# Patient Record
Sex: FEMALE | Race: WHITE | NOT HISPANIC OR LATINO | Employment: PART TIME | URBAN - METROPOLITAN AREA
[De-identification: names, ages, dates, MRNs, and addresses within clinical notes are randomized per-mention and may not be internally consistent; named-entity substitution may affect disease eponyms.]

---

## 2017-08-07 ENCOUNTER — HOSPITAL ENCOUNTER (EMERGENCY)
Facility: HOSPITAL | Age: 32
Discharge: HOME | End: 2017-08-07
Admitting: EMERGENCY MEDICINE
Payer: MEDICARE

## 2017-08-07 VITALS
WEIGHT: 260 LBS | TEMPERATURE: 98.3 F | DIASTOLIC BLOOD PRESSURE: 98 MMHG | RESPIRATION RATE: 18 BRPM | HEART RATE: 93 BPM | SYSTOLIC BLOOD PRESSURE: 167 MMHG | OXYGEN SATURATION: 98 %

## 2017-08-07 DIAGNOSIS — M54.50 ACUTE EXACERBATION OF CHRONIC LOW BACK PAIN: Primary | ICD-10-CM

## 2017-08-07 DIAGNOSIS — G89.29 ACUTE EXACERBATION OF CHRONIC LOW BACK PAIN: Primary | ICD-10-CM

## 2017-08-07 LAB
BACTERIA UR QL AUTO: ABNORMAL /HPF
BILIRUB UR QL STRIP: NEGATIVE
CLARITY UR: CLEAR
COLOR UR: YELLOW
GLUCOSE UR STRIP-MCNC: NEGATIVE MG/DL
HCG UR QL: NEGATIVE
HGB UR QL STRIP.AUTO: NEGATIVE
KETONES UR STRIP-MCNC: ABNORMAL MG/DL
LEUKOCYTE ESTERASE UR QL STRIP: NEGATIVE
NITRITE UR QL STRIP: NEGATIVE
NON-SQ EPI CELLS URNS QL MICRO: ABNORMAL /HPF
PH UR STRIP.AUTO: 7 [PH] (ref 4.5–8)
PROT UR STRIP-MCNC: ABNORMAL MG/DL
RBC #/AREA URNS AUTO: ABNORMAL /HPF
SP GR UR STRIP.AUTO: 1.02 (ref 1–1.03)
UROBILINOGEN UR QL STRIP.AUTO: 1 E.U./DL
WBC #/AREA URNS AUTO: ABNORMAL /HPF

## 2017-08-07 PROCEDURE — 81025 URINE PREGNANCY TEST: CPT | Performed by: PHYSICIAN ASSISTANT

## 2017-08-07 PROCEDURE — 99283 EMERGENCY DEPT VISIT LOW MDM: CPT

## 2017-08-07 PROCEDURE — 81002 URINALYSIS NONAUTO W/O SCOPE: CPT | Performed by: PHYSICIAN ASSISTANT

## 2017-08-07 PROCEDURE — 81001 URINALYSIS AUTO W/SCOPE: CPT

## 2017-08-07 PROCEDURE — 96372 THER/PROPH/DIAG INJ SC/IM: CPT

## 2017-08-07 RX ORDER — CYCLOBENZAPRINE HCL 5 MG
10 TABLET ORAL 3 TIMES DAILY PRN
Qty: 15 TABLET | Refills: 0 | Status: SHIPPED | OUTPATIENT
Start: 2017-08-07 | End: 2017-09-18 | Stop reason: ALTCHOICE

## 2017-08-07 RX ORDER — KETOROLAC TROMETHAMINE 30 MG/ML
15 INJECTION, SOLUTION INTRAMUSCULAR; INTRAVENOUS ONCE
Status: COMPLETED | OUTPATIENT
Start: 2017-08-07 | End: 2017-08-07

## 2017-08-07 RX ADMIN — KETOROLAC TROMETHAMINE 15 MG: 30 INJECTION, SOLUTION INTRAMUSCULAR at 19:32

## 2017-08-13 ENCOUNTER — HOSPITAL ENCOUNTER (EMERGENCY)
Facility: HOSPITAL | Age: 32
Discharge: HOME | End: 2017-08-13
Payer: MEDICARE

## 2017-08-13 VITALS
DIASTOLIC BLOOD PRESSURE: 87 MMHG | HEART RATE: 87 BPM | RESPIRATION RATE: 16 BRPM | SYSTOLIC BLOOD PRESSURE: 157 MMHG | TEMPERATURE: 98.1 F | OXYGEN SATURATION: 97 %

## 2017-08-13 DIAGNOSIS — M54.50 ACUTE BILATERAL LOW BACK PAIN WITHOUT SCIATICA: Primary | ICD-10-CM

## 2017-08-13 PROCEDURE — 99283 EMERGENCY DEPT VISIT LOW MDM: CPT

## 2017-08-13 RX ORDER — IBUPROFEN 800 MG/1
800 TABLET ORAL EVERY 8 HOURS PRN
Qty: 30 TABLET | Refills: 0 | Status: SHIPPED | OUTPATIENT
Start: 2017-08-13 | End: 2017-09-18 | Stop reason: ALTCHOICE

## 2017-09-18 ENCOUNTER — APPOINTMENT (EMERGENCY)
Dept: RADIOLOGY | Facility: HOSPITAL | Age: 32
End: 2017-09-18
Payer: MEDICARE

## 2017-09-18 ENCOUNTER — HOSPITAL ENCOUNTER (EMERGENCY)
Facility: HOSPITAL | Age: 32
Discharge: HOME | End: 2017-09-18
Attending: EMERGENCY MEDICINE | Admitting: EMERGENCY MEDICINE
Payer: MEDICARE

## 2017-09-18 VITALS
OXYGEN SATURATION: 96 % | RESPIRATION RATE: 20 BRPM | WEIGHT: 267.8 LBS | DIASTOLIC BLOOD PRESSURE: 71 MMHG | TEMPERATURE: 98 F | HEART RATE: 89 BPM | SYSTOLIC BLOOD PRESSURE: 128 MMHG

## 2017-09-18 DIAGNOSIS — J20.9 ACUTE BRONCHITIS: Primary | ICD-10-CM

## 2017-09-18 PROCEDURE — 93005 ELECTROCARDIOGRAM TRACING: CPT | Performed by: EMERGENCY MEDICINE

## 2017-09-18 PROCEDURE — 99285 EMERGENCY DEPT VISIT HI MDM: CPT

## 2017-09-18 PROCEDURE — 96372 THER/PROPH/DIAG INJ SC/IM: CPT

## 2017-09-18 PROCEDURE — 94640 AIRWAY INHALATION TREATMENT: CPT

## 2017-09-18 PROCEDURE — 71020 HB CHEST X-RAY 2VW FRONTAL&LATL: CPT

## 2017-09-18 RX ORDER — PREDNISONE 20 MG/1
40 TABLET ORAL DAILY
Qty: 8 TABLET | Refills: 0 | Status: SHIPPED | OUTPATIENT
Start: 2017-09-18 | End: 2017-09-22

## 2017-09-18 RX ORDER — ALBUTEROL SULFATE 90 UG/1
2 AEROSOL, METERED RESPIRATORY (INHALATION) EVERY 4 HOURS PRN
Qty: 1 INHALER | Refills: 0 | Status: SHIPPED | OUTPATIENT
Start: 2017-09-18

## 2017-09-18 RX ORDER — AZITHROMYCIN 250 MG/1
TABLET, FILM COATED ORAL
Qty: 6 TABLET | Refills: 0 | Status: SHIPPED | OUTPATIENT
Start: 2017-09-18

## 2017-09-18 RX ORDER — PREDNISONE 20 MG/1
60 TABLET ORAL ONCE
Status: COMPLETED | OUTPATIENT
Start: 2017-09-18 | End: 2017-09-18

## 2017-09-18 RX ORDER — KETOROLAC TROMETHAMINE 30 MG/ML
60 INJECTION, SOLUTION INTRAMUSCULAR; INTRAVENOUS ONCE
Status: COMPLETED | OUTPATIENT
Start: 2017-09-18 | End: 2017-09-18

## 2017-09-18 RX ORDER — IPRATROPIUM BROMIDE AND ALBUTEROL SULFATE 2.5; .5 MG/3ML; MG/3ML
3 SOLUTION RESPIRATORY (INHALATION) ONCE
Status: COMPLETED | OUTPATIENT
Start: 2017-09-18 | End: 2017-09-18

## 2017-09-18 RX ADMIN — PREDNISONE 60 MG: 20 TABLET ORAL at 12:53

## 2017-09-18 RX ADMIN — IPRATROPIUM BROMIDE AND ALBUTEROL SULFATE 3 ML: .5; 3 SOLUTION RESPIRATORY (INHALATION) at 14:32

## 2017-09-18 RX ADMIN — IPRATROPIUM BROMIDE AND ALBUTEROL SULFATE 3 ML: .5; 3 SOLUTION RESPIRATORY (INHALATION) at 12:57

## 2017-09-18 RX ADMIN — KETOROLAC TROMETHAMINE 60 MG: 30 INJECTION, SOLUTION INTRAMUSCULAR at 14:33

## 2017-09-19 LAB
ATRIAL RATE: 81 BPM
P AXIS: 62 DEGREES
PR INTERVAL: 150 MS
QRS AXIS: 8 DEGREES
QRSD INTERVAL: 82 MS
QT INTERVAL: 382 MS
QTC INTERVAL: 443 MS
T WAVE AXIS: 19 DEGREES
VENTRICULAR RATE: 81 BPM

## 2018-01-05 ENCOUNTER — APPOINTMENT (EMERGENCY)
Dept: RADIOLOGY | Facility: HOSPITAL | Age: 33
End: 2018-01-05
Payer: COMMERCIAL

## 2018-01-05 ENCOUNTER — HOSPITAL ENCOUNTER (EMERGENCY)
Facility: HOSPITAL | Age: 33
Discharge: HOME | End: 2018-01-05
Admitting: EMERGENCY MEDICINE
Payer: COMMERCIAL

## 2018-01-05 VITALS
SYSTOLIC BLOOD PRESSURE: 173 MMHG | TEMPERATURE: 97.6 F | HEART RATE: 85 BPM | RESPIRATION RATE: 16 BRPM | OXYGEN SATURATION: 98 % | DIASTOLIC BLOOD PRESSURE: 104 MMHG

## 2018-01-05 DIAGNOSIS — M84.474A METATARSAL FRACTURE, PATHOLOGIC, RIGHT, INITIAL ENCOUNTER: Primary | ICD-10-CM

## 2018-01-05 PROCEDURE — 73630 X-RAY EXAM OF FOOT: CPT

## 2018-01-05 PROCEDURE — 99284 EMERGENCY DEPT VISIT MOD MDM: CPT

## 2018-01-05 RX ORDER — NAPROXEN 500 MG/1
500 TABLET ORAL 2 TIMES DAILY WITH MEALS
Qty: 14 TABLET | Refills: 0 | Status: SHIPPED | OUTPATIENT
Start: 2018-01-05 | End: 2018-01-12

## 2018-01-06 NOTE — ED NOTES
While sitting at nurses station, heard yelling coming from pts room, pt screaming "what the fuck!" Upon arrival, pt stomping feet into ground, irritated stating "what the fuck is taking so long" Explained to pt that she is next to be seen and kindly asked patient to not yell due to other patients being in the department  Pt responded "well then you should close the door" Pt continued to mumble "[address]" under her breath       Hedy Brunner, RN  01/05/18 0963
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
## 2018-02-08 ENCOUNTER — HOSPITAL ENCOUNTER (OUTPATIENT)
Dept: RADIOLOGY | Facility: HOSPITAL | Age: 33
Discharge: HOME | End: 2018-02-08
Attending: PODIATRIST
Payer: COMMERCIAL

## 2018-02-08 ENCOUNTER — TRANSCRIBE ORDERS (OUTPATIENT)
Dept: ADMINISTRATIVE | Facility: HOSPITAL | Age: 33
End: 2018-02-08

## 2018-02-08 DIAGNOSIS — S92.324A CLOSED NONDISPLACED FRACTURE OF SECOND METATARSAL BONE OF RIGHT FOOT, INITIAL ENCOUNTER: ICD-10-CM

## 2018-02-08 DIAGNOSIS — S92.324A CLOSED NONDISPLACED FRACTURE OF SECOND METATARSAL BONE OF RIGHT FOOT, INITIAL ENCOUNTER: Primary | ICD-10-CM

## 2018-02-08 PROCEDURE — 73630 X-RAY EXAM OF FOOT: CPT

## 2018-03-13 ENCOUNTER — HOSPITAL ENCOUNTER (OUTPATIENT)
Dept: RADIOLOGY | Facility: HOSPITAL | Age: 33
Discharge: HOME | End: 2018-03-13
Attending: PODIATRIST
Payer: COMMERCIAL

## 2018-03-13 ENCOUNTER — TRANSCRIBE ORDERS (OUTPATIENT)
Dept: ADMINISTRATIVE | Facility: HOSPITAL | Age: 33
End: 2018-03-13

## 2018-03-13 DIAGNOSIS — S92.334A CLOSED NONDISPLACED FRACTURE OF THIRD METATARSAL BONE OF RIGHT FOOT, INITIAL ENCOUNTER: ICD-10-CM

## 2018-03-13 DIAGNOSIS — S92.324A CLOSED NONDISPLACED FRACTURE OF SECOND METATARSAL BONE OF RIGHT FOOT, INITIAL ENCOUNTER: ICD-10-CM

## 2018-03-13 DIAGNOSIS — S92.324A CLOSED NONDISPLACED FRACTURE OF SECOND METATARSAL BONE OF RIGHT FOOT, INITIAL ENCOUNTER: Primary | ICD-10-CM

## 2018-03-13 PROCEDURE — 73630 X-RAY EXAM OF FOOT: CPT

## 2019-05-26 ENCOUNTER — HOSPITAL ENCOUNTER (EMERGENCY)
Facility: HOSPITAL | Age: 34
Discharge: HOME | End: 2019-05-26
Attending: EMERGENCY MEDICINE
Payer: MEDICARE

## 2019-05-26 VITALS
WEIGHT: 268.96 LBS | TEMPERATURE: 98 F | RESPIRATION RATE: 18 BRPM | BODY MASS INDEX: 46.17 KG/M2 | SYSTOLIC BLOOD PRESSURE: 140 MMHG | HEART RATE: 72 BPM | DIASTOLIC BLOOD PRESSURE: 95 MMHG | OXYGEN SATURATION: 99 %

## 2019-05-26 DIAGNOSIS — L30.4 INTERTRIGO: Primary | ICD-10-CM

## 2019-05-26 PROCEDURE — 99282 EMERGENCY DEPT VISIT SF MDM: CPT

## 2019-05-26 PROCEDURE — 99282 EMERGENCY DEPT VISIT SF MDM: CPT | Performed by: PHYSICIAN ASSISTANT

## 2019-05-26 RX ORDER — CLOTRIMAZOLE 1 %
1 CREAM (GRAM) TOPICAL 2 TIMES DAILY
Qty: 80 G | Refills: 0 | Status: SHIPPED | OUTPATIENT
Start: 2019-05-26 | End: 2019-07-05

## 2019-05-26 RX ORDER — CLOTRIMAZOLE 1 %
CREAM (GRAM) TOPICAL ONCE
Status: DISCONTINUED | OUTPATIENT
Start: 2019-05-26 | End: 2019-05-27 | Stop reason: HOSPADM

## 2024-01-16 ENCOUNTER — HOSPITAL ENCOUNTER (INPATIENT)
Facility: HOSPITAL | Age: 39
LOS: 4 days | Discharge: HOME | DRG: 773 | End: 2024-01-20
Attending: EMERGENCY MEDICINE | Admitting: EMERGENCY MEDICINE
Payer: MEDICARE

## 2024-01-16 DIAGNOSIS — F11.93 OPIOID WITHDRAWAL (HCC): ICD-10-CM

## 2024-01-16 DIAGNOSIS — F11.90 OPIOID USE DISORDER: ICD-10-CM

## 2024-01-16 DIAGNOSIS — F11.10 HEROIN ABUSE (HCC): Primary | ICD-10-CM

## 2024-01-16 DIAGNOSIS — L03.90 CELLULITIS: ICD-10-CM

## 2024-01-16 DIAGNOSIS — F19.10 INTRAVENOUS DRUG ABUSE (HCC): ICD-10-CM

## 2024-01-16 DIAGNOSIS — F14.10 COCAINE ABUSE (HCC): ICD-10-CM

## 2024-01-16 DIAGNOSIS — R76.8 HEPATITIS C ANTIBODY POSITIVE IN BLOOD: ICD-10-CM

## 2024-01-16 DIAGNOSIS — I10 HYPERTENSION, UNSPECIFIED TYPE: ICD-10-CM

## 2024-01-16 PROBLEM — F17.200 TOBACCO USE DISORDER: Status: ACTIVE | Noted: 2024-01-16

## 2024-01-16 LAB
ALBUMIN SERPL BCP-MCNC: 4 G/DL (ref 3.5–5)
ALP SERPL-CCNC: 65 U/L (ref 34–104)
ALT SERPL W P-5'-P-CCNC: 8 U/L (ref 7–52)
ANION GAP SERPL CALCULATED.3IONS-SCNC: 8 MMOL/L
AST SERPL W P-5'-P-CCNC: 14 U/L (ref 13–39)
ATRIAL RATE: 79 BPM
BASOPHILS # BLD AUTO: 0.03 THOUSANDS/ÂΜL (ref 0–0.1)
BASOPHILS NFR BLD AUTO: 1 % (ref 0–1)
BILIRUB SERPL-MCNC: 0.24 MG/DL (ref 0.2–1)
BUN SERPL-MCNC: 9 MG/DL (ref 5–25)
CALCIUM SERPL-MCNC: 9.5 MG/DL (ref 8.4–10.2)
CHLORIDE SERPL-SCNC: 103 MMOL/L (ref 96–108)
CO2 SERPL-SCNC: 27 MMOL/L (ref 21–32)
CREAT SERPL-MCNC: 0.86 MG/DL (ref 0.6–1.3)
EOSINOPHIL # BLD AUTO: 0.17 THOUSAND/ÂΜL (ref 0–0.61)
EOSINOPHIL NFR BLD AUTO: 3 % (ref 0–6)
ERYTHROCYTE [DISTWIDTH] IN BLOOD BY AUTOMATED COUNT: 15.5 % (ref 11.6–15.1)
ETHANOL SERPL-MCNC: <10 MG/DL
GFR SERPL CREATININE-BSD FRML MDRD: 87 ML/MIN/1.73SQ M
GLUCOSE SERPL-MCNC: 89 MG/DL (ref 65–140)
HCT VFR BLD AUTO: 41.6 % (ref 34.8–46.1)
HGB BLD-MCNC: 13.6 G/DL (ref 11.5–15.4)
IMM GRANULOCYTES # BLD AUTO: 0.01 THOUSAND/UL (ref 0–0.2)
IMM GRANULOCYTES NFR BLD AUTO: 0 % (ref 0–2)
LYMPHOCYTES # BLD AUTO: 2.12 THOUSANDS/ÂΜL (ref 0.6–4.47)
LYMPHOCYTES NFR BLD AUTO: 33 % (ref 14–44)
MCH RBC QN AUTO: 27.2 PG (ref 26.8–34.3)
MCHC RBC AUTO-ENTMCNC: 32.7 G/DL (ref 31.4–37.4)
MCV RBC AUTO: 83 FL (ref 82–98)
MONOCYTES # BLD AUTO: 0.42 THOUSAND/ÂΜL (ref 0.17–1.22)
MONOCYTES NFR BLD AUTO: 7 % (ref 4–12)
NEUTROPHILS # BLD AUTO: 3.6 THOUSANDS/ÂΜL (ref 1.85–7.62)
NEUTS SEG NFR BLD AUTO: 56 % (ref 43–75)
NRBC BLD AUTO-RTO: 0 /100 WBCS
P AXIS: 73 DEGREES
PLATELET # BLD AUTO: 441 THOUSANDS/UL (ref 149–390)
PMV BLD AUTO: 8.5 FL (ref 8.9–12.7)
POTASSIUM SERPL-SCNC: 3.8 MMOL/L (ref 3.5–5.3)
PR INTERVAL: 152 MS
PROT SERPL-MCNC: 7.9 G/DL (ref 6.4–8.4)
QRS AXIS: -5 DEGREES
QRSD INTERVAL: 86 MS
QT INTERVAL: 368 MS
QTC INTERVAL: 421 MS
RBC # BLD AUTO: 5 MILLION/UL (ref 3.81–5.12)
SODIUM SERPL-SCNC: 138 MMOL/L (ref 135–147)
T WAVE AXIS: 57 DEGREES
VENTRICULAR RATE: 79 BPM
WBC # BLD AUTO: 6.35 THOUSAND/UL (ref 4.31–10.16)

## 2024-01-16 PROCEDURE — 85025 COMPLETE CBC W/AUTO DIFF WBC: CPT | Performed by: EMERGENCY MEDICINE

## 2024-01-16 PROCEDURE — 99291 CRITICAL CARE FIRST HOUR: CPT | Performed by: PHYSICIAN ASSISTANT

## 2024-01-16 PROCEDURE — 93005 ELECTROCARDIOGRAM TRACING: CPT

## 2024-01-16 PROCEDURE — 99285 EMERGENCY DEPT VISIT HI MDM: CPT | Performed by: EMERGENCY MEDICINE

## 2024-01-16 PROCEDURE — 80053 COMPREHEN METABOLIC PANEL: CPT | Performed by: EMERGENCY MEDICINE

## 2024-01-16 PROCEDURE — 99284 EMERGENCY DEPT VISIT MOD MDM: CPT

## 2024-01-16 PROCEDURE — 90715 TDAP VACCINE 7 YRS/> IM: CPT | Performed by: PHYSICIAN ASSISTANT

## 2024-01-16 PROCEDURE — 36415 COLL VENOUS BLD VENIPUNCTURE: CPT | Performed by: EMERGENCY MEDICINE

## 2024-01-16 PROCEDURE — 82077 ASSAY SPEC XCP UR&BREATH IA: CPT | Performed by: EMERGENCY MEDICINE

## 2024-01-16 RX ORDER — TRAZODONE HYDROCHLORIDE 50 MG/1
50 TABLET ORAL
Status: DISCONTINUED | OUTPATIENT
Start: 2024-01-16 | End: 2024-01-20 | Stop reason: HOSPADM

## 2024-01-16 RX ORDER — ENOXAPARIN SODIUM 100 MG/ML
40 INJECTION SUBCUTANEOUS DAILY
Status: DISCONTINUED | OUTPATIENT
Start: 2024-01-16 | End: 2024-01-20 | Stop reason: HOSPADM

## 2024-01-16 RX ORDER — SODIUM CHLORIDE 9 MG/ML
100 INJECTION, SOLUTION INTRAVENOUS CONTINUOUS
Status: DISCONTINUED | OUTPATIENT
Start: 2024-01-16 | End: 2024-01-17

## 2024-01-16 RX ORDER — HYDROXYZINE 50 MG/1
50 TABLET, FILM COATED ORAL EVERY 6 HOURS PRN
Status: DISCONTINUED | OUTPATIENT
Start: 2024-01-16 | End: 2024-01-16

## 2024-01-16 RX ORDER — ONDANSETRON 2 MG/ML
4 INJECTION INTRAMUSCULAR; INTRAVENOUS EVERY 6 HOURS PRN
Status: DISCONTINUED | OUTPATIENT
Start: 2024-01-16 | End: 2024-01-20 | Stop reason: HOSPADM

## 2024-01-16 RX ORDER — SENNOSIDES 8.6 MG
1 TABLET ORAL DAILY
Status: DISCONTINUED | OUTPATIENT
Start: 2024-01-16 | End: 2024-01-20 | Stop reason: HOSPADM

## 2024-01-16 RX ORDER — IBUPROFEN 400 MG/1
400 TABLET ORAL EVERY 4 HOURS PRN
Status: DISCONTINUED | OUTPATIENT
Start: 2024-01-16 | End: 2024-01-20 | Stop reason: HOSPADM

## 2024-01-16 RX ORDER — GABAPENTIN 300 MG/1
300 CAPSULE ORAL EVERY 8 HOURS PRN
Status: DISCONTINUED | OUTPATIENT
Start: 2024-01-16 | End: 2024-01-20 | Stop reason: HOSPADM

## 2024-01-16 RX ORDER — SODIUM CHLORIDE 9 MG/ML
3 INJECTION INTRAVENOUS
Status: DISCONTINUED | OUTPATIENT
Start: 2024-01-16 | End: 2024-01-16

## 2024-01-16 RX ORDER — NICOTINE 21 MG/24HR
1 PATCH, TRANSDERMAL 24 HOURS TRANSDERMAL DAILY
Status: DISCONTINUED | OUTPATIENT
Start: 2024-01-16 | End: 2024-01-20 | Stop reason: HOSPADM

## 2024-01-16 RX ORDER — LANOLIN ALCOHOL/MO/W.PET/CERES
6 CREAM (GRAM) TOPICAL
Status: DISCONTINUED | OUTPATIENT
Start: 2024-01-16 | End: 2024-01-20 | Stop reason: HOSPADM

## 2024-01-16 RX ORDER — CLONIDINE HYDROCHLORIDE 0.1 MG/1
0.1 TABLET ORAL EVERY 6 HOURS PRN
Status: DISCONTINUED | OUTPATIENT
Start: 2024-01-16 | End: 2024-01-20 | Stop reason: HOSPADM

## 2024-01-16 RX ORDER — CLONAZEPAM 0.5 MG/1
1 TABLET ORAL 4 TIMES DAILY PRN
Status: DISCONTINUED | OUTPATIENT
Start: 2024-01-16 | End: 2024-01-19

## 2024-01-16 RX ORDER — DOCUSATE SODIUM 100 MG/1
100 CAPSULE, LIQUID FILLED ORAL 2 TIMES DAILY
Status: DISCONTINUED | OUTPATIENT
Start: 2024-01-16 | End: 2024-01-16

## 2024-01-16 RX ORDER — BUPRENORPHINE 20 UG/H
20 PATCH TRANSDERMAL
Status: COMPLETED | OUTPATIENT
Start: 2024-01-16 | End: 2024-01-19

## 2024-01-16 RX ORDER — DIAZEPAM 5 MG/1
5 TABLET ORAL ONCE
Status: COMPLETED | OUTPATIENT
Start: 2024-01-16 | End: 2024-01-16

## 2024-01-16 RX ADMIN — SODIUM CHLORIDE 100 ML/HR: 0.9 INJECTION, SOLUTION INTRAVENOUS at 23:50

## 2024-01-16 RX ADMIN — BUPRENORPHINE 20 MCG: 20 PATCH, EXTENDED RELEASE TRANSDERMAL at 15:31

## 2024-01-16 RX ADMIN — IBUPROFEN 400 MG: 400 TABLET ORAL at 15:30

## 2024-01-16 RX ADMIN — SODIUM CHLORIDE 100 ML/HR: 0.9 INJECTION, SOLUTION INTRAVENOUS at 15:30

## 2024-01-16 RX ADMIN — CLONIDINE HYDROCHLORIDE 0.1 MG: 0.1 TABLET ORAL at 22:25

## 2024-01-16 RX ADMIN — TRAZODONE HYDROCHLORIDE 50 MG: 50 TABLET ORAL at 21:13

## 2024-01-16 RX ADMIN — DIAZEPAM 5 MG: 5 TABLET ORAL at 13:22

## 2024-01-16 RX ADMIN — GABAPENTIN 300 MG: 300 CAPSULE ORAL at 21:13

## 2024-01-16 RX ADMIN — ENOXAPARIN SODIUM 40 MG: 40 INJECTION SUBCUTANEOUS at 15:45

## 2024-01-16 RX ADMIN — TETANUS TOXOID, REDUCED DIPHTHERIA TOXOID AND ACELLULAR PERTUSSIS VACCINE, ADSORBED 0.5 ML: 5; 2.5; 8; 8; 2.5 SUSPENSION INTRAMUSCULAR at 17:35

## 2024-01-16 RX ADMIN — CLONAZEPAM 1 MG: 0.5 TABLET ORAL at 17:35

## 2024-01-16 RX ADMIN — NICOTINE POLACRILEX 2 MG: 2 GUM, CHEWING ORAL at 21:13

## 2024-01-16 RX ADMIN — MELATONIN TAB 3 MG 6 MG: 3 TAB at 22:25

## 2024-01-16 RX ADMIN — IBUPROFEN 400 MG: 400 TABLET ORAL at 21:13

## 2024-01-16 RX ADMIN — CLONIDINE HYDROCHLORIDE 0.1 MG: 0.1 TABLET ORAL at 15:30

## 2024-01-16 RX ADMIN — NICOTINE 1 PATCH: 21 PATCH, EXTENDED RELEASE TRANSDERMAL at 15:30

## 2024-01-16 NOTE — ASSESSMENT & PLAN NOTE
Patient admits to smoking approximately 12 cigarettes/day  Encourage cessation  Will order nicotine patch

## 2024-01-16 NOTE — ASSESSMENT & PLAN NOTE
"Patient admits to using IV heroin for the past 3 years straight, has not been sober in these past 3 years  She states she is using approximately 6-10 bags daily, states she tries not to use more than 1 bundle per day  Last use was around 1 AM on 1/16/24  She was on Suboxone years ago, only had it filled 1 time. Was also at methadone clinic recently and followed for 3 days only   Upon reviewing PDMP, last and only Suboxone prescription was filled on 2/1/2023 for 8 day supply   Agreeable to suboxone treatment  Initiate MAT protocol - See \"Opioid Withdrawal\"  "

## 2024-01-16 NOTE — ASSESSMENT & PLAN NOTE
"Patient has multiple injection sites on bilateral legs and arms, healing wound seen on right 5th digit (Patient says the wound has been present for about a month and appears to be slowly healing following initiation of the abx)  Denies any fevers, WBC count is normal - No evidence of acute infection at this time  Continue to monitor injection sites for signs of infection  HIV non-reactive  Hep C reactive (see \"Hepatitis C antibody positive in blood\")  Hep B nonreactive  Tetanus shot ordered  Obtain repeat CMP & CBC tomorrow am   Concern for possible soft tissue infection/osteomyelitis and bacteremia:  R hand Xray shows no evidence of osteomyelitis or significant soft tissue damage  CRP wnl  Blood cultures- no growth at 24 hours   Transitioned to PO antibiotics       "

## 2024-01-16 NOTE — ASSESSMENT & PLAN NOTE
Patient admits to smoking cocaine approximately twice weekly  She has been hypertensive throughout encounter likely secondary to anxiety  Denies any chest pain but admits to mild SOB with anxiety  EKG performed in the ED and reviewed by myself- no evidence of acute ischemia  UDS screen is pending  Encourage cessation, continue supportive care/comfort meds

## 2024-01-16 NOTE — ASSESSMENT & PLAN NOTE
"Patient admits to using IV heroin for the past 3 years straight, has not been sober in these past 3 years  She states she is using approximately 6-10 bags daily, states she tries not to use more than 1 bundle per day  Last use was around 1 AM on 1/16/24  She was on Suboxone years ago, only had it filled 1 time. Was also at methadone clinic recently and followed for 3 days only   Agreeable to suboxone treatment  HIV/hepatitis panel pending  Initiate MAT protocol - See \"Opioid Withdrawal\"  "

## 2024-01-16 NOTE — ED PROVIDER NOTES
History  No chief complaint on file.    38-year-old female presents requesting detoxification from heroin and cocaine.  She injects both.  She last used at approximately 0100 today.  She has been using 6 bags of heroin daily for the past few weeks, previously using 1-2 bundles daily for at least 3 months.  She denies other concomitant drug or alcohol use.  She has no significant ongoing medical problems.  She does have depression and anxiety which are untreated at this time, though she is supposed to be taking prescribed medications.  She smokes half a pack a day to ameliorate her psychiatric symptoms.  She injects multiple areas of her wrists, hands and legs, including skin popping.  There are several scabbed areas but none that appear actively infected.  She is beginning to feel withdrawal symptoms, including piloerection, anxiousness and nausea.  She is agreeable to buprenorphine treatment.  She denies SI, HI or command hallucinations.    Denies fever, HA, CP, SOB, abdominal pain, v/d. 12 system ROS o/w negative.            History provided by:  Patient and medical records  Detox Evaluation  Similar prior episodes: yes    Severity:  Moderate  Onset quality:  Gradual  Duration:  10 hours  Timing:  Constant  Progression:  Worsening  Chronicity:  Recurrent  Suspected agents:  Cocaine and heroin  Associated symptoms: nausea    Associated symptoms: no abdominal pain, no agitation, no bladder incontinence, no bowel incontinence, no confusion, no hallucinations, no headaches, no loss of consciousness, no palpitations, no seizures, no shortness of breath, no violence, no vomiting and no weakness    Risk factors: mental illness and withdrawal syndrome    Risk factors: no addiction treatment, no chronic illness, no psychiatric hx, no recent illness and no recent infection        Prior to Admission Medications   Prescriptions Last Dose Informant Patient Reported? Taking?   albuterol (PROVENTIL HFA,VENTOLIN HFA) 90 mcg/act  inhaler   No No   Sig: Inhale 2 puffs every 4 (four) hours as needed for wheezing   azithromycin (ZITHROMAX Z-MCKENNA) 250 mg tablet   No No   Si tabs po daily x 1 day, then 1 tab po daily x 4 days   clotrimazole (LOTRIMIN) 1 % cream   No No   Sig: Apply 1 g (1 application total) topically 2 (two) times a day for 40 days   naproxen (NAPROSYN) 500 mg tablet   No No   Sig: Take 1 tablet by mouth 2 (two) times a day with meals for 7 days      Facility-Administered Medications: None       Past Medical History:   Diagnosis Date   • Asthma    • Back pain        Past Surgical History:   Procedure Laterality Date   • FOOT SURGERY     • KNEE SURGERY         Family History   Problem Relation Age of Onset   • Diabetes type II Family      I have reviewed and agree with the history as documented.    E-Cigarette/Vaping     E-Cigarette/Vaping Substances     Social History     Tobacco Use   • Smoking status: Every Day   • Smokeless tobacco: Never   Substance Use Topics   • Alcohol use: Yes     Comment: occasional   • Drug use: No       Review of Systems   Constitutional:  Positive for chills. Negative for diaphoresis and fever.   HENT:  Negative for rhinorrhea, sore throat and trouble swallowing.    Respiratory:  Negative for cough, chest tightness, shortness of breath and wheezing.    Cardiovascular:  Negative for chest pain, palpitations and leg swelling.   Gastrointestinal:  Positive for nausea. Negative for abdominal distention, abdominal pain, bowel incontinence, constipation, diarrhea and vomiting.   Genitourinary:  Negative for bladder incontinence, difficulty urinating, dysuria, flank pain, frequency, hematuria and urgency.   Musculoskeletal:  Negative for arthralgias, back pain, myalgias, neck pain and neck stiffness.   Skin:  Negative for pallor and rash.   Neurological:  Negative for dizziness, seizures, loss of consciousness, weakness, light-headedness and headaches.   Hematological:  Negative for adenopathy.    Psychiatric/Behavioral:  Negative for agitation, confusion and hallucinations. The patient is not nervous/anxious.    All other systems reviewed and are negative.      Physical Exam  Physical Exam  Vitals reviewed.   Constitutional:       General: She is not in acute distress.     Appearance: She is well-developed. She is not ill-appearing, toxic-appearing or diaphoretic.   HENT:      Head: Normocephalic and atraumatic.      Right Ear: External ear normal.      Left Ear: External ear normal.      Nose: Nose normal.      Mouth/Throat:      Mouth: Mucous membranes are moist.      Pharynx: Oropharynx is clear. No oropharyngeal exudate.   Eyes:      General: No scleral icterus.     Conjunctiva/sclera: Conjunctivae normal.      Pupils: Pupils are equal, round, and reactive to light.   Cardiovascular:      Rate and Rhythm: Normal rate and regular rhythm.      Heart sounds: No murmur heard.  Pulmonary:      Effort: Pulmonary effort is normal.      Breath sounds: Normal breath sounds.   Abdominal:      General: Bowel sounds are normal. There is no distension.      Palpations: Abdomen is soft.      Tenderness: There is no abdominal tenderness. There is no right CVA tenderness or left CVA tenderness.   Musculoskeletal:         General: No tenderness. Normal range of motion.      Cervical back: Normal range of motion and neck supple.      Right lower leg: No edema.      Left lower leg: No edema.   Lymphadenopathy:      Cervical: No cervical adenopathy.   Skin:     General: Skin is warm and dry.      Coloration: Skin is not pale.      Findings: No erythema or rash.      Comments: Numerous acute and chronic skin pop lesions on both legs, scattered lesions on her hands, recent injection sites on both wrists.   Neurological:      General: No focal deficit present.      Mental Status: She is alert and oriented to person, place, and time.      Motor: No abnormal muscle tone.      Deep Tendon Reflexes: Reflexes are normal and  symmetric.   Psychiatric:         Mood and Affect: Mood normal.         Behavior: Behavior normal.         Thought Content: Thought content normal.         Vital Signs  ED Triage Vitals [01/16/24 1133]   Temperature Pulse Respirations Blood Pressure SpO2   98.6 °F (37 °C) 89 18 (!) 230/144 100 %      Temp Source Heart Rate Source Patient Position - Orthostatic VS BP Location FiO2 (%)   Tympanic Monitor Lying Left arm --      Pain Score       --           Vitals:    01/16/24 1133   BP: (!) 230/144   Pulse: 89   Patient Position - Orthostatic VS: Lying         Visual Acuity      ED Medications  Medications   sodium chloride (PF) 0.9 % injection 3 mL (has no administration in time range)       Diagnostic Studies  Results Reviewed       Procedure Component Value Units Date/Time    CBC [[id number]]     Lab Status: No result Specimen: Blood     CMP [[id number]]     Lab Status: No result Specimen: Blood     Serum ethanol (medical alcohol) [[id number]]     Lab Status: No result Specimen: Blood     Urine drug screen [[id number]]     Lab Status: No result Specimen: Urine                    No orders to display              Procedures  Procedures         ED Course  ED Course as of 01/16/24 1338   Tue Jan 16, 2024   1235 [facility] contacted for admission.                                             Medical Decision Making  MDM/DDx: Heroin abuse, cocaine abuse, depression, anxiety.     I independently reviewed and interpreted ordered labs from this encounter.    A/P: Will check detox admission workup, consult withdrawal management unit for admission.    Amount and/or Complexity of Data Reviewed  Labs: ordered.  ECG/medicine tests: ordered.    Risk  Prescription drug management.             Disposition  Final diagnoses:   None     ED Disposition       None          Follow-up Information    None         Patient's Medications   Discharge Prescriptions    No medications on file       No discharge procedures on file.    PDMP Review       None             ED Provider  Electronically Signed by             Fabian Mercado DO  01/16/24 6529

## 2024-01-16 NOTE — H&P
"HISTORY & PHYSICAL EXAM  DEPARTMENT OF MEDICAL TOXICOLOGY  LEVEL 4 MEDICAL DETOX UNIT  Karen Mason 38 y.o. female MRN: [id number]  Unit/Bed#:  DETOX 510-01 Encounter: [id number]      Reason for Admission/Principal Problem: Opioid withdrawal, opioid use disorder  Admitting Provider: Patti Mitchell PA-C  Attending Provider: Fam Moreno MD   1/16/2024 11:20 AM      Opioid use disorder  Assessment & Plan  Patient admits to using IV heroin for the past 3 years straight, has not been sober in these past 3 years  She states she is using approximately 6-10 bags daily, states she tries not to use more than 1 bundle per day  Last use was around 1 AM on 1/16/24  She was on Suboxone years ago, only had it filled 1 time. Was also at methadone clinic recently and followed for 3 days only   Upon reviewing PDMP, last and only Suboxone prescription was filled on 2/1/2023 for 8 day supply   Agreeable to suboxone treatment  HIV/hepatitis panel pending  Initiate MAT protocol - See \"Opioid Withdrawal\"    * Opioid withdrawal (HCC)  Assessment & Plan  See \"Opioid Use Disorder\"  Using IV heroin 6-10 bags daily, last use around 0100   Current withdrawal symptoms include chills, sweats, anxiety, hypertension  Butrans patch placed on admission  Initiate MAT protocol and monitor for appropriateness of suboxone induction at least 24 hours after last use (0100)  Continue comfort meds for symptoms including anxiety meds gabapentin, clonidine, klonopin and zofran for nausea  Case management consult for dispo plan, pt unsure about inpatient rehab at this point    Cocaine abuse (HCC)  Assessment & Plan  Patient admits to smoking cocaine approximately twice weekly  She has been hypertensive throughout encounter likely secondary to anxiety  Denies any chest pain but admits to mild SOB with anxiety  EKG performed in the ED and reviewed by myself- no evidence of acute ischemia  UDS screen is pending  Encourage cessation, continue " supportive care/comfort meds    Intravenous drug abuse (HCC)  Assessment & Plan  Patient has multiple injection sites on bilateral legs and arms, healing wound seen on right 5th digit  Denies any fevers, WBC count is normal - No evidence of acute infection at this time  Continue to monitor injection sites for signs of infection  Will screen for HIV and hepatitis  Tetanus shot ordered    Tobacco use disorder  Assessment & Plan  Patient admits to smoking approximately 12 cigarettes/day  Encourage cessation  Will order nicotine patch           Additional medical treatment(s) includes BP management- continue to monitor for now and may need to initiate HTN meds       VTE Prophylaxis: Enoxaparin (Lovenox)  / sequential compression device   Code Status: Full code      Anticipated Length of Stay:  Patient will be admitted on an Inpatient basis with an anticipated length of stay of  >2 midnights.   Justification for Hospital Stay: OPIOID USE DISORDER, OPIOID WITHDRAWAL     For any questions or concerns, please Tiger Text the advanced practitioner in the role of [facility]-DETOX-AP On Call      This patient qualifies for Level IV medically managed intensive inpatient services under the criteria set by the American Society of Addiction Medicine, including dimensions 1-3. The patient is in withdrawal (or is intoxicated with high risk of withdrawal), with severe and unstable medical and/or psychiatric (dual diagnosis) problems, requiring requires 24-hour medical and nursing care and the full resources of a licensed hospital.          MEDICATION ASSISTED TREATMENT PATHWAY    Admit patient to medical detox unit and continue supportive care and stabilization of acute opioid withdrawal per medical toxicology/detox medication assisted treatment pathway.     Complicated Opioid Withdrawal (ie associated with long acting agents, such as methadone or illicit fentanyl analogues):  >72 hours: Routine COWS/induction as per uncomplicated opoid  withdrawal (below)  <72 hours:  Adjunctive medications (see below), including clonazepam 1-2mg Q6 hrs PRN anxiety (or diazepam 5 mg if cannot take PO)  >48 hours, test dose buprenorphine 0.5-1mg.   If responds well, continue with 2mg Q2 hrs (total 8mg) holding for worsening withdrawal, then start maintenance dosing   If responds poorly, follow next step below   <48 hours and/or COWS < 6 or failed test dose, add Butrans transdermal patch 20-40mcg/hr, monitor for signs/symptoms of withdrawal   If within first 24-48 hrs, can administer buprenorphine 0.5-1mg Q6 hrs x 4, followed by 2mg Q2 hrs x 4 the next day  If surpassing 48 hrs, can administer buprenorphine 2mg Q2hrs if tolerated without transdermal patch or lower sublingual dose.   When complete, remove transdermal patch and start maintenance dosing   For moderate-severe withdrawal (COWS >/= 8, may still consider routine induction with buprenorphine 8 mg if appropriate.   For worsened or precipitated withdrawal, consider adjunctive benzodiazepines and other comfort meds. Dexmedetomidine may be considered for severe precipitated withdrawal.         Uncomplicated Opioid Withdrawal:  Monitor opioid severity via Clinical Opioid Withdrawal Scale (COWS) Q4 hours and administer buprenorphine/naloxone 8mg/2mg when COWS >8, or when greater than 24 hours have elapsed from most recent opioid use (excluding long-acting opioids, such as methadone). Continue to monitor opioid severity Q30-60 minutes after first dose and administer additional buprenorphine 2-4mg every 30-60 minutes until COWS < 8 for two consecutive hours.              Adjunctive medications administered as needed:  Clonidine 0.1 mg PO Q6 hours PRN anxiety or palpitations    Gabapentin 300mg PO Q8 hours PRN anxiety    Ibuprofen 600 mg PO Q6 hours PRN pain    Acetaminophen 1000mg PO Q8 hours PRN pain    Ondansetron 4 mg PO Q6 hours PRN N/V    Nicotine patch 7, 14, 21 mg  PRN nicotine withdrawal   Trazodone 50 mg PO  QHS PRN sleep    Loperamide 4 mg PO PRN diarrhea up to 16 mg/day       The risks, benefits and mechanism of buprenorphine/naloxone were discussed and patient agreed to treatment. Case management consultation will take place to assist with coordination of subsequent treatment after discharge.     Administer daily multivitamin. Evaluate and treat for coexisting substance use, such as nicotine. Discuss risk factors for infectious disease, such as history of intravenous drug abuse, and offer hepatitis and HIV screening if indicated.           Hx and PE limited by: None    HPI: Karen Mason is a 38 y.o. year old female who presents with opioid use disorder/withdrawal.  She presented to the hospitals ED today requesting detox from heroin and cocaine.  She states she uses 6-10 bags of IV heroin daily for the past few months, also admits to smoking cocaine twice weekly. Last use of heroin was around 0100 today. She denies alcohol use.  She states was on Suboxone in the past, also recently went to methadone clinic but only followed up for 3 days.  She is agreeable to suboxone induction.  She admits to feeling very anxious currently but denies SI or HI.  She has been hypertensive in the ED, states she has a history of hypertension but has not taken meds in a long time.  She denies headaches or chest pain but admits to mild shortness of breath at times, denies shortness of breath currently.  EKG without signs of acute ischemia.  She denies cardiac history.  Patient will be admitted to the detox unit and initiated on MAT protocol.    Opioids currently used: heroin  Route of use: intravenous  Date/Time of Last Opioid Use: 1/16/24 0100  Current Signs/Symptoms of Opioid Withdrawal: restlessness, anxiety, GI upset, and piloerection    COWS score:   Clinical Opiate Withdrawal Scale  Pulse: 78  Resting Pulse Rate: Measured After Patient is Sitting or Lying for One Minute: Pulse rate 80 or below  GI Upset: Over Last Half Hour: Stomach  cramps  Sweating: Over Past Half Hour Not Accounted for by Room Temperature of Patient Activity: Subjective report of chills or flushing  Tremor: Observation of Outstretched Hands: Tremor can be felt, but not observed  Restlessness: Observation During Assessment: Reports difficulty sitting still, but is able to do so  Yawning: Observation During Assessment: No yawning  Pupil Size: Pupils possibly larger than normal for room light  Anxiety and Irritability: Patient reports increasing irritability or anxiousness  Bone or Joint Aches: If Patient was Having Pain Previously, Only the Additional Component Attributed to Opiate Withdrawal is Scored: Mild diffuse discomfort  Gooseflesh Skin: Piloerection of skin can be felt or hairs standing up on arms  Runny Nose or Tearing: Not Accounted for by Cold Symptoms or Allergies: Nasal stuffiness of unusually moist eyes  Clinical Opiate Withdrawal Scale Total Score: 11        Methadone & Buprenorphine History  History of prior treatment for opioid dependence? yes  Currently on Methadone Maintenance? yes  History of prior treatment with Suboxone? yes  Currently taking Suboxone? no  History of using Suboxone without having a prescription? no  History of IVDA? yes  Co-existing substance use? yes    Review of PDMP: yes    Social History     Substance and Sexual Activity   Alcohol Use Yes    Comment: occasional     Social History     Substance and Sexual Activity   Drug Use Yes    Types: Cocaine, Heroin     Social History     Tobacco Use   Smoking Status Every Day   Smokeless Tobacco Never       Review of Systems   Constitutional:  Positive for chills and diaphoresis. Negative for activity change and fever.   HENT: Negative.     Eyes: Negative.    Respiratory:  Positive for shortness of breath. Negative for cough, choking, chest tightness, wheezing and stridor.    Cardiovascular:  Negative for chest pain, palpitations and leg swelling.   Gastrointestinal:  Negative for abdominal  distention, abdominal pain, constipation, diarrhea, nausea and vomiting.   Endocrine: Negative.    Genitourinary: Negative.    Musculoskeletal:  Positive for myalgias.   Skin: Negative.    Allergic/Immunologic: Negative.    Neurological:  Negative for dizziness, tremors, seizures, syncope, facial asymmetry, weakness, light-headedness, numbness and headaches.   Hematological: Negative.    Psychiatric/Behavioral:  Negative for agitation, confusion, hallucinations, self-injury and suicidal ideas. The patient is nervous/anxious.        Historical Information   Past Medical History:   Diagnosis Date    Asthma     Back pain      Past Surgical History:   Procedure Laterality Date    FOOT SURGERY      KNEE SURGERY       Family History   Problem Relation Age of Onset    Diabetes type II Family      Social History   Marital Status: Single   Occupation: Unemployed   Patient Pre-hospital Living Situation: Staying at a friend's house   Patient Pre-hospital Level of Mobility: Independent  Patient Pre-hospital Diet Restrictions: None    Allergies   Allergen Reactions    Tylenol [Acetaminophen]        Prior to Admission medications    Medication Sig Start Date End Date Taking? Authorizing Provider   albuterol (PROVENTIL HFA,VENTOLIN HFA) 90 mcg/act inhaler Inhale 2 puffs every 4 (four) hours as needed for wheezing 9/18/17   Anna Deshpande MD   azithromycin (ZITHROMAX Z-MCKENNA) 250 mg tablet 2 tabs po daily x 1 day, then 1 tab po daily x 4 days  Patient not taking: Reported on 1/16/2024 9/18/17   Anna Deshpande MD   clotrimazole (LOTRIMIN) 1 % cream Apply 1 g (1 application total) topically 2 (two) times a day for 40 days 5/26/19 7/5/19  Fuentes NARANJO PA-C   naproxen (NAPROSYN) 500 mg tablet Take 1 tablet by mouth 2 (two) times a day with meals for 7 days 1/5/18 1/12/18  Zack Rubio MD       Current Facility-Administered Medications   Medication Dose Route Frequency    clonazePAM (KlonoPIN) tablet 1 mg  1 mg  Oral 4x Daily PRN    cloNIDine (CATAPRES) tablet 0.1 mg  0.1 mg Oral Q6H PRN    enoxaparin (LOVENOX) subcutaneous injection 40 mg  40 mg Subcutaneous Daily    gabapentin (NEURONTIN) capsule 300 mg  300 mg Oral Q8H PRN    ibuprofen (MOTRIN) tablet 400 mg  400 mg Oral Q4H PRN    nicotine (NICODERM CQ) 21 mg/24 hr TD 24 hr patch 1 patch  1 patch Transdermal Daily    ondansetron (ZOFRAN) injection 4 mg  4 mg Intravenous Q6H PRN    senna (SENOKOT) tablet 8.6 mg  1 tablet Oral Daily    sodium chloride 0.9 % infusion  100 mL/hr Intravenous Continuous    tetanus-diphtheria-acellular pertussis (BOOSTRIX) IM injection 0.5 mL  0.5 mL Intramuscular Once    transdermal buprenorphine (BUTRANS) 20 mcg/hr TD patch 20 mcg  20 mcg Transdermal Q7 Days    traZODone (DESYREL) tablet 50 mg  50 mg Oral HS PRN       Continuous Infusions:sodium chloride, 100 mL/hr, Last Rate: 100 mL/hr (01/16/24 1530)             Objective     No intake or output data in the 24 hours ending 01/16/24 1551    Invasive Devices:   Peripheral IV Proximal;Right;Ventral (anterior) Forearm (Active)       Vitals   Vitals:    01/16/24 1133 01/16/24 1314 01/16/24 1512   BP: (!) 230/144 (!) 191/119 (!) 193/126   TempSrc: Tympanic  Temporal   Pulse: 89  78   Resp: 18  18   Patient Position - Orthostatic VS: Lying  Lying   Temp: 98.6 °F (37 °C)  98.2 °F (36.8 °C)       Physical Exam  Constitutional:       General: She is not in acute distress.     Appearance: Normal appearance. She is not ill-appearing or toxic-appearing.   HENT:      Head: Normocephalic and atraumatic.      Mouth/Throat:      Mouth: Mucous membranes are moist.   Eyes:      Extraocular Movements: Extraocular movements intact.      Conjunctiva/sclera: Conjunctivae normal.      Pupils: Pupils are equal, round, and reactive to light.   Cardiovascular:      Rate and Rhythm: Normal rate and regular rhythm.      Heart sounds: Normal heart sounds.   Pulmonary:      Effort: Pulmonary effort is normal.       "Breath sounds: Normal breath sounds.   Abdominal:      General: Abdomen is flat. There is no distension.      Palpations: Abdomen is soft.      Tenderness: There is no abdominal tenderness. There is no guarding or rebound.   Musculoskeletal:         General: Normal range of motion.      Cervical back: Normal range of motion and neck supple. No rigidity or tenderness.   Skin:     General: Skin is warm and dry.      Capillary Refill: Capillary refill takes less than 2 seconds.      Comments: Multiple injection sites seen on bilateral upper and lower extremities without acute signs of infection.  Chronic wound is seen on right fifth digit with mild surrounding erythema, normal temperature, full range of motion, no drainage   Neurological:      General: No focal deficit present.      Mental Status: She is alert and oriented to person, place, and time. Mental status is at baseline.      Sensory: No sensory deficit.      Motor: No weakness.   Psychiatric:         Attention and Perception: Attention normal.         Mood and Affect: Mood is anxious.         Speech: Speech normal.         Behavior: Behavior normal. Behavior is cooperative.         Thought Content: Thought content does not include homicidal or suicidal ideation.           DATA    EKG, Pathology, and Other Studies: I have personally reviewed pertinent reports.      Rate 79  Normal sinus rhythm, normal rate, normal axis, nonspecific T wave abnormality, no ST elevation or depression    Lab Results: I have personally reviewed pertinent reports.        CBC ETOH     Lab Results   Component Value Date    WBC 6.35 2024    RBC 5.00 2024    HGB 13.6 2024    HCT 41.6 2024    MCV 83 2024    MCH 27.2 2024    MCHC 32.7 2024    RDW 15.5 (H) 2024     (H) 2024    MPV 8.5 (L) 2024      No results found for: \"LACTICACID\"   CMP UA         Component Value Date/Time    K 3.8 2024 1159    CL " "103 01/16/2024 1159    CO2 27 01/16/2024 1159    BUN 9 01/16/2024 1159    CREATININE 0.86 01/16/2024 1159         Component Value Date/Time    CALCIUM 9.5 01/16/2024 1159    ALKPHOS 65 01/16/2024 1159    AST 14 01/16/2024 1159    ALT 8 01/16/2024 1159      Lab Results   Component Value Date    CLARITYU Clear 08/07/2017    COLORU Yellow 08/07/2017    SPECGRAV 1.020 08/07/2017    PHUR 7.0 08/07/2017    GLUCOSEU Negative 08/07/2017    KETONESU 40 (2+) (A) 08/07/2017    BLOODU Negative 08/07/2017    PROTEIN UA Trace (A) 08/07/2017    NITRITE Negative 08/07/2017    BILIRUBINUR Negative 08/07/2017    UROBILINOGEN 1.0 08/07/2017    LEUKOCYTESUR Negative 08/07/2017    WBCUA 0-1 (A) 08/07/2017    RBCUA 0-1 (A) 08/07/2017    BACTERIA Occasional 08/07/2017    EPIS Occasional 08/07/2017        Liver Function Test: ASA     Lab Results   Component Value Date    TBILI 0.24 01/16/2024    ALKPHOS 65 01/16/2024    AST 14 01/16/2024    ALT 8 01/16/2024    TP 7.9 01/16/2024    ALB 4.0 01/16/2024      No results found for: \"SALICYLATE\"   Troponin APAP     No results found for: \"TROPONINI\"   No results found for: \"ACTMNPHEN\"   VBG HCG     No results found for: \"PHVEN\", \"HTT4YNV\", \"PO2VEN\", \"MMD4CGU\", \"BEVEN\", \"T7QBYEUYV\", \"A4SJIPY\"   No results found for: \"HCGQUANT\"   ABG Urine Drug Screen     No results found for: \"PHART\", \"QFL4OUK\", \"PO2ART\", \"GJV4XBO\", \"BEART\", \"B9HEWGWJT\", \"O2HGB\", \"SOURC\", \"OLIVIA\", \"VTAC\", \"ACRATE\", \"INSPIREDAIR\", \"PEEP\"   No results found for: \"AMPMETHUR\", \"BARBTUR\", \"BDZUR\", \"COCAINEUR\", \"METHADONEUR\", \"OPIATEUR\", \"PCPUR\", \"THCUR\", \"OXYCODONEUR\"   Lactate INR     No results found for: \"LACTICACID\"   No results found for: \"INR\"   PTT Protime     No results found for: \"PTT\"   No results found for: \"PROTIME\"   Hepatitis HIV     No results found for: \"HEPBSAG\", \"HEPCAB\"   No results found for: \"YVWWJQR4VFG5\", \"CNP3E58YW\"         Imaging Studies: I have personally reviewed pertinent reports.          Counseling / " Coordination of Care  Total floor / unit time spent today 60 minutes. Greater than 50% of total time was spent with the patient and / or family counseling and / or coordination of care.       Minutes of critical care time 34  -Critical care time was exclusive of separately billable procedures and teaching time.   -Critical care was necessary to treat or prevent imminent or life-threatening deterioration of the following condition: CNS failure/compromise, toxidrome (opioid withdrawal), withdrawal  -Critical care time was spent personally by me on the following activities as well as the above as per the course and rest of chart: obtaining history from patient/surrogate, development of a treatment plan, discussions with referring provider(s), evaluation of patient's response to the treatment, examination of the patient, performing  treatments and interventions, re-evaluation of the patient's condition, review of old charts, ordering/interpreting laboratory studies, ordering/interpreting of radiographic studies.          ** Please Note: This note has been constructed using a voice recognition system. **

## 2024-01-16 NOTE — ASSESSMENT & PLAN NOTE
Patient has multiple injection sites on bilateral legs and arms, healing wound seen on right 5th digit  Denies any fevers, WBC count is normal - No evidence of acute infection at this time  Continue to monitor injection sites for signs of infection  Will screen for HIV and hepatitis  Tetanus shot ordered

## 2024-01-16 NOTE — ASSESSMENT & PLAN NOTE
"See \"Opioid Use Disorder\"  Using IV heroin 6-10 bags daily, last use around 0100  on 1/16  Current withdrawal symptoms include chills, sweats, anxiety, hypertension  Continue Suboxone 8 mg BID   Case management consulted for dispo plan, pt unsure about inpatient rehab at this point, may d/c w/ [facility] referral if going home  "

## 2024-01-16 NOTE — ASSESSMENT & PLAN NOTE
"See \"Opioid Use Disorder\"  Using IV heroin 6-10 bags daily, last use around 0100   Current withdrawal symptoms include chills, sweats, anxiety, hypertension  Butrans patch placed on admission  Initiate MAT protocol and monitor for appropriateness of suboxone induction at least 24 hours after last use (0100)  Continue comfort meds for symptoms including anxiety meds gabapentin, clonidine, klonopin and zofran for nausea  Case management consult for dispo plan, pt unsure about inpatient rehab at this point  "

## 2024-01-17 ENCOUNTER — APPOINTMENT (INPATIENT)
Dept: RADIOLOGY | Facility: HOSPITAL | Age: 39
DRG: 773 | End: 2024-01-17
Payer: MEDICARE

## 2024-01-17 PROBLEM — R76.8 HEPATITIS C ANTIBODY POSITIVE IN BLOOD: Status: ACTIVE | Noted: 2024-01-17

## 2024-01-17 LAB
CRP SERPL QL: 1.7 MG/L
HAV IGM SER QL: ABNORMAL
HBV CORE IGM SER QL: ABNORMAL
HBV SURFACE AG SER QL: ABNORMAL
HCV AB SER QL: REACTIVE
HIV 1+2 AB+HIV1 P24 AG SERPL QL IA: NORMAL
HIV1 P24 AG SER QL: NORMAL

## 2024-01-17 PROCEDURE — 86140 C-REACTIVE PROTEIN: CPT

## 2024-01-17 PROCEDURE — 80074 ACUTE HEPATITIS PANEL: CPT | Performed by: EMERGENCY MEDICINE

## 2024-01-17 PROCEDURE — 99233 SBSQ HOSP IP/OBS HIGH 50: CPT | Performed by: EMERGENCY MEDICINE

## 2024-01-17 PROCEDURE — 73130 X-RAY EXAM OF HAND: CPT

## 2024-01-17 PROCEDURE — 87806 HIV AG W/HIV1&2 ANTB W/OPTIC: CPT | Performed by: EMERGENCY MEDICINE

## 2024-01-17 PROCEDURE — 87040 BLOOD CULTURE FOR BACTERIA: CPT

## 2024-01-17 RX ORDER — BUPRENORPHINE AND NALOXONE 8; 2 MG/1; MG/1
8 FILM, SOLUBLE BUCCAL; SUBLINGUAL 2 TIMES DAILY
Status: DISCONTINUED | OUTPATIENT
Start: 2024-01-18 | End: 2024-01-20 | Stop reason: HOSPADM

## 2024-01-17 RX ORDER — CEFEPIME HYDROCHLORIDE 2 G/50ML
2000 INJECTION, SOLUTION INTRAVENOUS EVERY 12 HOURS
Status: DISCONTINUED | OUTPATIENT
Start: 2024-01-17 | End: 2024-01-19

## 2024-01-17 RX ORDER — CLONIDINE HYDROCHLORIDE 0.1 MG/1
0.2 TABLET ORAL ONCE
Status: COMPLETED | OUTPATIENT
Start: 2024-01-17 | End: 2024-01-17

## 2024-01-17 RX ORDER — VANCOMYCIN HYDROCHLORIDE 1 G/200ML
15 INJECTION, SOLUTION INTRAVENOUS EVERY 12 HOURS
Status: DISCONTINUED | OUTPATIENT
Start: 2024-01-17 | End: 2024-01-19

## 2024-01-17 RX ORDER — BUPRENORPHINE 2 MG/1
0.5 TABLET SUBLINGUAL
Status: COMPLETED | OUTPATIENT
Start: 2024-01-17 | End: 2024-01-17

## 2024-01-17 RX ORDER — HYDRALAZINE HYDROCHLORIDE 20 MG/ML
10 INJECTION INTRAMUSCULAR; INTRAVENOUS ONCE
Status: COMPLETED | OUTPATIENT
Start: 2024-01-17 | End: 2024-01-17

## 2024-01-17 RX ORDER — BUPRENORPHINE AND NALOXONE 2; .5 MG/1; MG/1
2 FILM, SOLUBLE BUCCAL; SUBLINGUAL
Status: COMPLETED | OUTPATIENT
Start: 2024-01-17 | End: 2024-01-18

## 2024-01-17 RX ORDER — HYDRALAZINE HYDROCHLORIDE 20 MG/ML
5 INJECTION INTRAMUSCULAR; INTRAVENOUS EVERY 6 HOURS PRN
Status: DISCONTINUED | OUTPATIENT
Start: 2024-01-17 | End: 2024-01-18

## 2024-01-17 RX ADMIN — BUPRENORPHINE AND NALOXONE 2 MG: 2; .5 FILM BUCCAL; SUBLINGUAL at 23:12

## 2024-01-17 RX ADMIN — HYDRALAZINE HYDROCHLORIDE 10 MG: 20 INJECTION INTRAMUSCULAR; INTRAVENOUS at 06:33

## 2024-01-17 RX ADMIN — CLONIDINE HYDROCHLORIDE 0.1 MG: 0.1 TABLET ORAL at 20:04

## 2024-01-17 RX ADMIN — Medication 0.5 MG: at 17:52

## 2024-01-17 RX ADMIN — IBUPROFEN 400 MG: 400 TABLET ORAL at 17:07

## 2024-01-17 RX ADMIN — MULTIPLE VITAMINS W/ MINERALS TAB 1 TABLET: TAB ORAL at 08:45

## 2024-01-17 RX ADMIN — IBUPROFEN 400 MG: 400 TABLET ORAL at 21:29

## 2024-01-17 RX ADMIN — NICOTINE POLACRILEX 2 MG: 2 GUM, CHEWING ORAL at 20:04

## 2024-01-17 RX ADMIN — BUPRENORPHINE AND NALOXONE 2 MG: 2; .5 FILM BUCCAL; SUBLINGUAL at 19:53

## 2024-01-17 RX ADMIN — IBUPROFEN 400 MG: 400 TABLET ORAL at 11:18

## 2024-01-17 RX ADMIN — Medication 0.5 MG: at 15:57

## 2024-01-17 RX ADMIN — Medication 0.5 MG: at 13:56

## 2024-01-17 RX ADMIN — IBUPROFEN 400 MG: 400 TABLET ORAL at 05:11

## 2024-01-17 RX ADMIN — HYDRALAZINE HYDROCHLORIDE 5 MG: 20 INJECTION INTRAMUSCULAR; INTRAVENOUS at 15:57

## 2024-01-17 RX ADMIN — CEFEPIME HYDROCHLORIDE 2000 MG: 2 INJECTION, SOLUTION INTRAVENOUS at 14:36

## 2024-01-17 RX ADMIN — NICOTINE POLACRILEX 2 MG: 2 GUM, CHEWING ORAL at 12:20

## 2024-01-17 RX ADMIN — CLONAZEPAM 1 MG: 0.5 TABLET ORAL at 14:36

## 2024-01-17 RX ADMIN — Medication 0.5 MG: at 12:14

## 2024-01-17 RX ADMIN — CLONAZEPAM 1 MG: 0.5 TABLET ORAL at 05:11

## 2024-01-17 RX ADMIN — CLONIDINE HYDROCHLORIDE 0.2 MG: 0.1 TABLET ORAL at 05:29

## 2024-01-17 RX ADMIN — NICOTINE POLACRILEX 2 MG: 2 GUM, CHEWING ORAL at 17:07

## 2024-01-17 RX ADMIN — VANCOMYCIN HYDROCHLORIDE 1000 MG: 1 INJECTION, SOLUTION INTRAVENOUS at 12:57

## 2024-01-17 RX ADMIN — BUPRENORPHINE AND NALOXONE 2 MG: 2; .5 FILM BUCCAL; SUBLINGUAL at 21:16

## 2024-01-17 NOTE — UTILIZATION REVIEW
Initial Clinical Review    Pt presented to [facility] for its Level IV medically managed intensive inpatient detox unit.    Admission: Date/Time/Statement:   Admission Orders (From admission, onward)       Ordered        01/16/24 1509  INPATIENT ADMISSION  Once                          Orders Placed This Encounter   Procedures    INPATIENT ADMISSION     Standing Status:   Standing     Number of Occurrences:   1     Order Specific Question:   Level of Care     Answer:   Med Surg [16]     Comments:   tele     Order Specific Question:   Bed request comments     Answer:   Detox Unit     Order Specific Question:   Estimated length of stay     Answer:   More than 2 Midnights     Order Specific Question:   Certification     Answer:   I certify that inpatient services are medically necessary for this patient for a duration of greater than two midnights. See H&P and MD Progress Notes for additional information about the patient's course of treatment.     ED Arrival Information       Expected   -    Arrival   1/16/2024 11:18    Acuity   Emergent              Means of arrival   Walk-In    Escorted by   Self    Service   Emergency Medicine    Admission type   Emergency              Arrival complaint   Drug Detox             Chief Complaint   Patient presents with    Detox Evaluation     Uses 5-6 heroin bags a day, once in a while does cocaine. Last heroin was this am. Pt would like help. She is extremely tearful.         Initial Presentation: 38 y.o. female who presented to medical detox. Inpatient admission for evaluation and treatment of acute opioid withdrawal. Presented w/ request for detox from heroin and cocaine. Uses 6-10 bags of IV heroin daily, last use 1/16 @ 0100. On exam, restlessness, anxiety, GI upset, piloerection. COWS 11. Plan: COWS monitoring w/ symptomatic supportive care, Butrans patch, IVF, micro induction of Suboxone when clinically indicated, telemetry, continuous pulse ox, Trend labs,  replete electrolytes as needed. Continue PTA meds. Screen for HIV and hepatitis.       Date: 01/17/24 Day 2: Pt reports decreased sleep, noting only sleeping for 1 hour at a time. Notes night sweats, anxiety, intermittent heart palpitations. On exam, tachycardic, scattered injection sites on b/l UE and LE, healing wound on R 5th digit, anxious. COWS 4.  Hep C reactive. Plan: continue COWS monitoring w/ symptomatic supportive care, Butrans patch, start micro induction of Suboxone w/ 0.5 mg q2h doses x4, telemetry, continuous pulse ox, Trend labs, replete electrolytes as needed. Continue PTA meds. Tetanus shot. Check blood cultures & CRP, XR R hand. Start IV vancomycin and cefepime.    ED Triage Vitals   Temperature Pulse Respirations Blood Pressure SpO2   01/16/24 1133 01/16/24 1133 01/16/24 1133 01/16/24 1133 01/16/24 1133   98.6 °F (37 °C) 89 18 (!) 230/144 100 %      Temp Source Heart Rate Source Patient Position - Orthostatic VS BP Location FiO2 (%)   01/16/24 1133 01/16/24 1133 01/16/24 1133 01/16/24 1133 --   Tympanic Monitor Lying Left arm       Pain Score       01/16/24 1530       4          Wt Readings from Last 1 Encounters:   01/16/24 74.6 kg (164 lb 7.4 oz)     Additional Vital Signs:   Date/Time Temp Pulse Resp BP MAP (mmHg) SpO2 O2 Device   01/17/24 0848 97.3 °F (36.3 °C) Abnormal  86 16 184/120 Abnormal  -- 100 % None (Room air)   01/17/24 0631 -- -- -- 230/130 Abnormal  -- -- --   01/17/24 0626 -- -- -- 231/145 Abnormal  -- -- --   01/17/24 0514 98.1 °F (36.7 °C) 73 18 191/122 Abnormal  -- 99 % None (Room air)   01/17/24 0513 -- -- -- 191/121 Abnormal  -- -- --   01/16/24 2309 -- -- -- 190/133 Abnormal  -- -- --   01/16/24 2305 97.3 °F (36.3 °C) Abnormal  68 18 -- -- 99 % None (Room air)   01/16/24 2225 -- -- -- 199/124 Abnormal  -- -- --   01/16/24 2220 97.6 °F (36.4 °C) 63 18 199/124 Abnormal  149 98 % None (Room air)   01/16/24 2002 97.7 °F (36.5 °C) 86 18 152/91 -- -- --   01/16/24 1740 -- 78 16  187/115 Abnormal  -- 100 % None (Room air)   01/16/24 1512 98.2 °F (36.8 °C) 78 18 193/126 Abnormal  -- 100 % None (Room air)   01/16/24 1314 -- -- -- 191/119 Abnormal  -- -- --       Clinical Opioid Withdrawal Scale (COWS):   Row Name 01/17/24 0848 01/17/24 0631   Resting Pulse Rate: Measured After Patient is Sitting or Lying for One Minute 1  -FRANTZ 0   GI Upset: Over Last Half Hour 0  -FRANTZ 1   Sweating: Over Past Half Hour Not Accounted for by Room Temperature of Patient Activity 0  -FRANTZ 1   Tremor: Observation of Outstretched Hands 0  -FRANTZ 1   Restlessness: Observation During Assessment 0  -FRANTZ 1   Yawning: Observation During Assessment 0  -FRANTZ 0   Pupil Size 1  -FRANTZ 1   Anxiety and Irritability 1  -FRANTZ 1   Bone or Joint Aches: If Patient was Having Pain Previously, Only the Additional Component Attributed to Opiate Withdrawal is Scored 0  -FRANTZ 1   Gooseflesh Skin 0  -FRANTZ 3   Runny Nose or Tearing: Not Accounted for by Cold Symptoms or Allergies 1  -FRANTZ 1   Clinical Opiate Withdrawal Scale Total Score 4  -FRANTZ 11         Pertinent Labs/Diagnostic Test Results:   Results from last 7 days   Lab Units 01/16/24  1159   WBC Thousand/uL 6.35   HEMOGLOBIN g/dL 13.6   HEMATOCRIT % 41.6   PLATELETS Thousands/uL 441*   NEUTROS ABS Thousands/µL 3.60         Results from last 7 days   Lab Units 01/16/24  1159   SODIUM mmol/L 138   POTASSIUM mmol/L 3.8   CHLORIDE mmol/L 103   CO2 mmol/L 27   ANION GAP mmol/L 8   BUN mg/dL 9   CREATININE mg/dL 0.86   EGFR ml/min/1.73sq m 87   CALCIUM mg/dL 9.5     Results from last 7 days   Lab Units 01/16/24  1159   AST U/L 14   ALT U/L 8   ALK PHOS U/L 65   TOTAL PROTEIN g/dL 7.9   ALBUMIN g/dL 4.0   TOTAL BILIRUBIN mg/dL 0.24         Results from last 7 days   Lab Units 01/16/24  1159   GLUCOSE RANDOM mg/dL 89     Results from last 7 days   Lab Units 01/16/24  1159   ETHANOL LVL mg/dL <10         ED Treatment:   Medication Administration from 01/16/2024 1118 to 01/16/2024 1511         Date/Time Order  Dose Route Action     01/16/2024 1322 EST diazepam (VALIUM) tablet 5 mg 5 mg Oral Given          Past Medical History:   Diagnosis Date    Asthma     Back pain        Admitting Diagnosis: Opioid withdrawal (HCC) [F11.93]  Cocaine abuse (HCC) [F14.10]  Heroin abuse (HCC) [F11.10]  Admitted to substance misuse detoxification center [Z78.9]  Age/Sex: 38 y.o. female  Admission Orders:  Regular Diet. SCDs.  COWS monitoring.  Telemetry & Continuous Pulse Ox.    Scheduled Medications:  buprenorphine, 0.5 mg, Sublingual, Q2H  cefepime, 2,000 mg, Intravenous, Q12H  enoxaparin, 40 mg, Subcutaneous, Daily  multivitamin-minerals, 1 tablet, Oral, Daily  nicotine, 1 patch, Transdermal, Daily  senna, 1 tablet, Oral, Daily  transdermal buprenorphine, 20 mcg, Transdermal, Q7 Days  vancomycin, 15 mg/kg, Intravenous, Q12H    Continuous IV Infusions:  sodium chloride, 100 mL/hr, Intravenous, Continuous    PRN Meds:  clonazePAM, 1 mg, Oral, 4x Daily PRN; 1/16 x1, 1/17 x1  cloNIDine, 0.1 mg, Oral, Q6H PRN; 1/16 x2  cloNIDine, 0.2 mg, Oral, 1/17 x1  gabapentin, 300 mg, Oral, Q8H PRN; 1/16 x1  hydrALAZINE, 10 mg, Intravenous; 1/17 x1  ibuprofen, 400 mg, Oral, Q4H PRN; 1/16 x2, 1/17 x1  melatonin, 6 mg, Oral, HS PRN; 1/16 x1  nicotine polacrilex, 2 mg, Oral, Q2H PRN; 1/16 x1  ondansetron, 4 mg, Intravenous, Q6H PRN  traZODone, 50 mg, Oral, HS PRN; 1/16 x1    tetanus-diphtheria-acellular pertussis (BOOSTRIX) IM injection 0.5 mL  Dose: 0.5 mL Freq: Once Route: IM  Start: 01/16/24 1600 End: 01/16/24 1735       IP CONSULT TO CASE MANAGEMENT    Network Utilization Review Department  ATTENTION: Please call with any questions or concerns to [phone] and carefully listen to the prompts so that you are directed to the right person. All voicemails are confidential.   For Discharge needs, contact Care Management DC Support Team at [phone] opt. [phone]  Send all requests for admission clinical reviews, approved or denied determinations and any other  requests to dedicated fax number below belonging to the campus where the patient is receiving treatment. List of dedicated fax numbers for the Facilities:  FACILITY NAME UR FAX NUMBER   ADMISSION DENIALS (Administrative/Medical Necessity) [phone]   DISCHARGE SUPPORT TEAM (NETWORK) [phone]   PARENT CHILD HEALTH (Maternity/NICU/Pediatrics) [phone]   [facility] [phone]   [facility] [phone]   [facility] [phone]   [facility] [phone]   [facility] [phone]   [facility] [phone]   [facility] [phone]   [facility] [phone]   [facility] [phone]   [facility] [phone]   [facility] [phone]

## 2024-01-17 NOTE — NURSING NOTE
Patient bp elevated, checked twice on each arm. PA aware one time does of clonidine given. Will recheck bp/ reassess patient.

## 2024-01-17 NOTE — NURSING NOTE
BP still remains high, checked automatically, and manually. PA notified. One time order of hydralazine given. Will reassess and monitor patient.

## 2024-01-17 NOTE — PLAN OF CARE
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
## 2024-01-17 NOTE — PROGRESS NOTES
Karen Mason is a 38 y.o. female who is currently ordered Vancomycin IV with management by the Pharmacy Consult service.  Relevant clinical data and objective / subjective history reviewed.  Vancomycin Assessment:  Indication and Goal AUC/Trough: Soft tissue (goal -600, trough >10)  Clinical Status: stable  Micro:   pending  Renal Function:  SCr: 0.86 mg/dL  CrCl: 89.5 mL/min  Renal replacement: Not on dialysis  Days of Therapy: 1  Current Dose: 1000 mg IV q12h  Vancomycin Plan:  New Dosing: dosed appropriate  Estimated AUC: 476 mcg*hr/mL  Estimated Trough: 14.6 mcg/mL  Next Level: 01/19/24 at  06:00  Renal Function Monitoring: Daily BMP and UOP  Pharmacy will continue to follow closely for s/sx of nephrotoxicity, infusion reactions and appropriateness of therapy.  BMP and CBC will be ordered per protocol. We will continue to follow the patient’s culture results and clinical progress daily.    Hazel Raymundo, Pharmacist

## 2024-01-17 NOTE — ASSESSMENT & PLAN NOTE
HEPATITIS C ANTIBODY IGM + IGG WITH REFLEX TO HCV PCR  Component  Ref Range & Units 1/17/20 1040   HEPATITIS C ANTIBODY  NON-REACTIVE REACTIVE Abnormal    SIGNAL TO CUT-OFF  <1.00 32.80 High       HCV RNA, QUANTITATIVE REAL TIME PCR  NOT DETECTED IU/mL 1,180,000 High    HCV RNA, QUANTITATIVE REAL TIME PCR  NOT DETECTED Log IU/mL 6.07 High        F/u w/ GI or Hepatologist on d/c, obtain outpatient RNA quant test

## 2024-01-17 NOTE — PROGRESS NOTES
"PROGRESS NOTE  DEPARTMENT OF MEDICAL TOXICOLOGY  LEVEL 4 MEDICAL DETOX UNIT  Karen Mason 38 y.o. female MRN: [id number]  Unit/Bed#:  DETOX 510-01 Encounter: [id number]      Reason for Admission/Principal Problem: Opoid withdrawal  Rounding Provider: Joaquín Rodriguez MD  Attending Provider: Fam Moreno DO   1/16/2024 11:20 AM           * Opioid withdrawal (HCC)  Assessment & Plan  See \"Opioid Use Disorder\"  Using IV heroin 6-10 bags daily, last use around 0100  on 1/16  Current withdrawal symptoms include chills, sweats, anxiety, hypertension  Butrans patch placed on admission on 1/16  Start microinduction w/ Subutex 0.5 mg q2h x 4 doses  Initiate MAT protocol and monitor for appropriateness of suboxone induction at least 24 hours after last use (0100)  Continue comfort meds for symptoms including anxiety meds gabapentin, clonidine, klonopin and zofran for nausea  Case management consult for dispo plan, pt unsure about inpatient rehab at this point    Opioid use disorder  Assessment & Plan  Patient admits to using IV heroin for the past 3 years straight, has not been sober in these past 3 years  She states she is using approximately 6-10 bags daily, states she tries not to use more than 1 bundle per day  Last use was around 1 AM on 1/16/24  She was on Suboxone years ago, only had it filled 1 time. Was also at methadone clinic recently and followed for 3 days only   Upon reviewing PDMP, last and only Suboxone prescription was filled on 2/1/2023 for 8 day supply   Agreeable to suboxone treatment  HIV/hepatitis panel pending  Initiate MAT protocol - See \"Opioid Withdrawal\"    Intravenous drug abuse (HCC)  Assessment & Plan  Patient has multiple injection sites on bilateral legs and arms, healing wound seen on right 5th digit  Denies any fevers, WBC count is normal - No evidence of acute infection at this time  Continue to monitor injection sites for signs of infection  HIV non-reactive  Hep C reactive (see " "\"Hepatitis C antibody positive in blood\")  Hep B nonreactive  Tetanus shot ordered  Obtain repeat CMP & CBC tomorrow am   Concern for skin/soft tissue infection and bacteremia:  Obtain blood cultures  Start Vancomycin and Cefepime empirically  Obtain R hand Xray  Obtain CRP    Cocaine abuse (HCC)  Assessment & Plan  Patient admits to smoking cocaine approximately twice weekly  She has been hypertensive throughout encounter likely secondary to anxiety  Denies any chest pain but admits to mild SOB with anxiety  EKG performed in the ED and reviewed by myself- no evidence of acute ischemia  UDS screen is pending  Encourage cessation, continue supportive care/comfort meds    Hepatitis C antibody positive in blood  Assessment & Plan  HEPATITIS C ANTIBODY IGM + IGG WITH REFLEX TO HCV PCR  Component  Ref Range & Units 1/17/20 1040   HEPATITIS C ANTIBODY  NON-REACTIVE REACTIVE Abnormal    SIGNAL TO CUT-OFF  <1.00 32.80 High       HCV RNA, QUANTITATIVE REAL TIME PCR  NOT DETECTED IU/mL 1,180,000 High    HCV RNA, QUANTITATIVE REAL TIME PCR  NOT DETECTED Log IU/mL 6.07 High        F/u w/ GI or Hepatologist on d/c    Tobacco use disorder  Assessment & Plan  Patient admits to smoking approximately 12 cigarettes/day  Encourage cessation  Will order nicotine patch             VTE Pharmacologic Prophylaxis:   Pharmacologic: Enoxaparin (Lovenox)  Mechanical VTE Prophylaxis in Place: yes    Code Status: Level 1 - Full Code    Patient Centered Rounds: I have performed bedside rounds with nursing staff today.    Discussions with Specialists or Other Care Team Provider: None     Education and Discussions with Family / Patient: Yes    Time Spent for Care: 30 minutes.  More than 50% of total time spent on counseling and coordination of care as described above.    Current Length of Stay: 1 day(s)    Current Patient Status: Inpatient     Certification Statement: The patient will continue to require additional inpatient hospital stay due to " opoid withdrawal, OUD  Discharge Plan: pending, inpatient rehab vs home w/ outpatient resources        Subjective:   Patient reports decreased sleep overnight, saying she has only been able to sleep an hour at a time. She also reports night sweats, anxiety, and intermittent heart palpitations. She denies any chest pain, abdominal pain, or shortness of breath.    Objective:     Clinical Opiate Withdrawal Scale  Pulse: 95  Resting Pulse Rate: Measured After Patient is Sitting or Lying for One Minute: Pulse rate   GI Upset: Over Last Half Hour: No GI symptoms  Sweating: Over Past Half Hour Not Accounted for by Room Temperature of Patient Activity: Subjective report of chills or flushing  Tremor: Observation of Outstretched Hands: Tremor can be felt, but not observed  Restlessness: Observation During Assessment: Reports difficulty sitting still, but is able to do so  Yawning: Observation During Assessment: No yawning  Pupil Size: Pupils possibly larger than normal for room light  Anxiety and Irritability: Patient reports increasing irritability or anxiousness  Bone or Joint Aches: If Patient was Having Pain Previously, Only the Additional Component Attributed to Opiate Withdrawal is Scored: Mild diffuse discomfort  Gooseflesh Skin: Piloerection of skin can be felt or hairs standing up on arms  Runny Nose or Tearing: Not Accounted for by Cold Symptoms or Allergies: Nasal stuffiness of unusually moist eyes  Clinical Opiate Withdrawal Scale Total Score: 11          Last 24 Hours Medication List:   Current Facility-Administered Medications   Medication Dose Route Frequency Provider Last Rate    buprenorphine  0.5 mg Sublingual Q2H Joaquín Rodriguez MD      cefepime  2,000 mg Intravenous Q12H Joaquín Rodriguez MD      clonazePAM  1 mg Oral 4x Daily PRN Patti Mitchell PA-C      cloNIDine  0.1 mg Oral Q6H PRN Patti Mitchell PA-C      enoxaparin  40 mg Subcutaneous Daily Patti Mitchell PA-C      gabapentin   300 mg Oral Q8H PRN Patti LukeKAYLENE mcginnis      ibuprofen  400 mg Oral Q4H PRN Patticarlin LukeKAYLENE mcginnis      melatonin  6 mg Oral HS PRN Gloria Flores PA-C      multivitamin-minerals  1 tablet Oral Daily Patti LukeKAYLENE mcginnis      nicotine  1 patch Transdermal Daily Patti LukeKAYLENE mcginnis      nicotine polacrilex  2 mg Oral Q2H PRN Patti LukeKAYLENE mcginnis      ondansetron  4 mg Intravenous Q6H PRN Patti LukeKAYLENE mcginnis      senna  1 tablet Oral Daily Patticarlin LukeKAYLENE mcginnis      sodium chloride  100 mL/hr Intravenous Continuous Patticarlin Arciniega KAYLENE Mitchell 100 mL/hr (24 2350)    transdermal buprenorphine  20 mcg Transdermal Q7 Days Patticarlin LukeKAYLENE mcginnis      traZODone  50 mg Oral HS PRN Patti LukeKAYLENE mcginnis      vancomycin  15 mg/kg Intravenous Q12H Joaquín Rodriguez MD           Vitals:   Temp (24hrs), Av.7 °F (36.5 °C), Min:97.3 °F (36.3 °C), Max:98.2 °F (36.8 °C)    Temp:  [97.3 °F (36.3 °C)-98.2 °F (36.8 °C)] 97.7 °F (36.5 °C)  HR:  [63-95] 95  Resp:  [16-18] 18  BP: (152-231)/() 158/107  SpO2:  [98 %-100 %] 98 %  Body mass index is 28.23 kg/m².     Input and Output Summary (last 24 hours):No intake or output data in the 24 hours ending 24 1216    Physical Exam:   Physical Exam  Vitals and nursing note reviewed.   Constitutional:       General: She is not in acute distress.     Appearance: She is well-developed. She is ill-appearing.   HENT:      Head: Normocephalic and atraumatic.   Eyes:      Conjunctiva/sclera: Conjunctivae normal.   Cardiovascular:      Rate and Rhythm: Regular rhythm. Tachycardia present.      Heart sounds: No murmur heard.  Pulmonary:      Effort: Pulmonary effort is normal. No respiratory distress.      Breath sounds: Normal breath sounds.   Abdominal:      Palpations: Abdomen is soft.      Tenderness: There is no abdominal tenderness.   Musculoskeletal:         General: No swelling.      Cervical back: Neck supple.    Skin:     General: Skin is warm and dry.      Capillary Refill: Capillary refill takes less than 2 seconds.      Comments: Patient has multiple injection sites on bilateral legs and arms, healing wound seen on right 5th digit   Neurological:      Mental Status: She is alert.   Psychiatric:         Mood and Affect: Mood is anxious.         Additional Data:     Labs:   Results from last 7 days   Lab Units 01/16/24  1159   WBC Thousand/uL 6.35   HEMOGLOBIN g/dL 13.6   HEMATOCRIT % 41.6   PLATELETS Thousands/uL 441*   NEUTROS PCT % 56   LYMPHS PCT % 33   MONOS PCT % 7   EOS PCT % 3      Results from last 7 days   Lab Units 01/16/24  1159   SODIUM mmol/L 138   POTASSIUM mmol/L 3.8   CHLORIDE mmol/L 103   CO2 mmol/L 27   BUN mg/dL 9   CREATININE mg/dL 0.86   ANION GAP mmol/L 8   CALCIUM mg/dL 9.5   ALBUMIN g/dL 4.0   TOTAL BILIRUBIN mg/dL 0.24   ALK PHOS U/L 65   ALT U/L 8   AST U/L 14   GLUCOSE RANDOM mg/dL 89                              * I Have Reviewed All Lab Data Listed Above.  * Additional Pertinent Lab Tests Reviewed: All Labs For Current Hospital Admission Reviewed      Imaging Studies: I have personally reviewed pertinent reports.        Recent Cultures (last 7 days):          Today, Patient Was Seen By: Joaquín Rodriguez MD    ** Please Note: Dictation voice to text software may have been used in the creation of this document. **

## 2024-01-18 ENCOUNTER — APPOINTMENT (INPATIENT)
Dept: CT IMAGING | Facility: HOSPITAL | Age: 39
DRG: 773 | End: 2024-01-18
Payer: MEDICARE

## 2024-01-18 ENCOUNTER — APPOINTMENT (INPATIENT)
Dept: NON INVASIVE DIAGNOSTICS | Facility: HOSPITAL | Age: 39
DRG: 773 | End: 2024-01-18
Payer: MEDICARE

## 2024-01-18 PROBLEM — I10 HIGH BLOOD PRESSURE: Status: ACTIVE | Noted: 2024-01-18

## 2024-01-18 PROBLEM — R00.0 TACHYCARDIA: Status: ACTIVE | Noted: 2024-01-18

## 2024-01-18 LAB
2HR DELTA HS TROPONIN: 4 NG/L
4HR DELTA HS TROPONIN: 3 NG/L
ALBUMIN SERPL BCP-MCNC: 3.7 G/DL (ref 3.5–5)
ALP SERPL-CCNC: 59 U/L (ref 34–104)
ALT SERPL W P-5'-P-CCNC: 6 U/L (ref 7–52)
ANION GAP SERPL CALCULATED.3IONS-SCNC: 10 MMOL/L
AORTIC ROOT: 3.2 CM
APICAL FOUR CHAMBER EJECTION FRACTION: 61 %
AST SERPL W P-5'-P-CCNC: 12 U/L (ref 13–39)
ATRIAL RATE: 110 BPM
BASOPHILS # BLD AUTO: 0.03 THOUSANDS/ÂΜL (ref 0–0.1)
BASOPHILS NFR BLD AUTO: 0 % (ref 0–1)
BILIRUB SERPL-MCNC: 0.47 MG/DL (ref 0.2–1)
BSA FOR ECHO PROCEDURE: 1.8 M2
BUN SERPL-MCNC: 10 MG/DL (ref 5–25)
CALCIUM SERPL-MCNC: 9.4 MG/DL (ref 8.4–10.2)
CARDIAC TROPONIN I PNL SERPL HS: 10 NG/L
CARDIAC TROPONIN I PNL SERPL HS: 11 NG/L
CARDIAC TROPONIN I PNL SERPL HS: 7 NG/L
CHLORIDE SERPL-SCNC: 105 MMOL/L (ref 96–108)
CO2 SERPL-SCNC: 23 MMOL/L (ref 21–32)
CREAT SERPL-MCNC: 0.75 MG/DL (ref 0.6–1.3)
D DIMER PPP FEU-MCNC: 0.77 UG/ML FEU
E WAVE DECELERATION TIME: 253 MS
E/A RATIO: 0.85
EOSINOPHIL # BLD AUTO: 0.09 THOUSAND/ÂΜL (ref 0–0.61)
EOSINOPHIL NFR BLD AUTO: 1 % (ref 0–6)
ERYTHROCYTE [DISTWIDTH] IN BLOOD BY AUTOMATED COUNT: 15.3 % (ref 11.6–15.1)
FLUAV RNA RESP QL NAA+PROBE: NEGATIVE
FLUBV RNA RESP QL NAA+PROBE: NEGATIVE
FRACTIONAL SHORTENING: 33 (ref 28–44)
GFR SERPL CREATININE-BSD FRML MDRD: 102 ML/MIN/1.73SQ M
GLUCOSE SERPL-MCNC: 77 MG/DL (ref 65–140)
HCT VFR BLD AUTO: 38.5 % (ref 34.8–46.1)
HGB BLD-MCNC: 12.6 G/DL (ref 11.5–15.4)
IMM GRANULOCYTES # BLD AUTO: 0.01 THOUSAND/UL (ref 0–0.2)
IMM GRANULOCYTES NFR BLD AUTO: 0 % (ref 0–2)
INTERVENTRICULAR SEPTUM IN DIASTOLE (PARASTERNAL SHORT AXIS VIEW): 1.2 CM
INTERVENTRICULAR SEPTUM: 1.2 CM (ref 0.6–1.1)
LAAS-AP2: 18.1 CM2
LAAS-AP4: 18.2 CM2
LEFT ATRIUM SIZE: 3.6 CM
LEFT ATRIUM VOLUME (MOD BIPLANE): 55 ML
LEFT ATRIUM VOLUME INDEX (MOD BIPLANE): 30.6 ML/M2
LEFT INTERNAL DIMENSION IN SYSTOLE: 3 CM (ref 2.1–4)
LEFT VENTRICULAR INTERNAL DIMENSION IN DIASTOLE: 4.5 CM (ref 3.5–6)
LEFT VENTRICULAR POSTERIOR WALL IN END DIASTOLE: 1.2 CM
LEFT VENTRICULAR STROKE VOLUME: 58 ML
LVSV (TEICH): 58 ML
LYMPHOCYTES # BLD AUTO: 2.85 THOUSANDS/ÂΜL (ref 0.6–4.47)
LYMPHOCYTES NFR BLD AUTO: 38 % (ref 14–44)
MCH RBC QN AUTO: 26.8 PG (ref 26.8–34.3)
MCHC RBC AUTO-ENTMCNC: 32.7 G/DL (ref 31.4–37.4)
MCV RBC AUTO: 82 FL (ref 82–98)
MONOCYTES # BLD AUTO: 0.74 THOUSAND/ÂΜL (ref 0.17–1.22)
MONOCYTES NFR BLD AUTO: 10 % (ref 4–12)
MV E'TISSUE VEL-SEP: 8 CM/S
MV PEAK A VEL: 0.71 M/S
MV PEAK E VEL: 60 CM/S
MV STENOSIS PRESSURE HALF TIME: 73 MS
MV VALVE AREA P 1/2 METHOD: 3.01
NEUTROPHILS # BLD AUTO: 3.78 THOUSANDS/ÂΜL (ref 1.85–7.62)
NEUTS SEG NFR BLD AUTO: 51 % (ref 43–75)
NRBC BLD AUTO-RTO: 0 /100 WBCS
P AXIS: 71 DEGREES
PLATELET # BLD AUTO: 472 THOUSANDS/UL (ref 149–390)
PMV BLD AUTO: 9.1 FL (ref 8.9–12.7)
POTASSIUM SERPL-SCNC: 3.5 MMOL/L (ref 3.5–5.3)
PR INTERVAL: 132 MS
PROT SERPL-MCNC: 7.8 G/DL (ref 6.4–8.4)
QRS AXIS: 9 DEGREES
QRSD INTERVAL: 76 MS
QT INTERVAL: 328 MS
QTC INTERVAL: 443 MS
RA PRESSURE ESTIMATED: 5 MMHG
RBC # BLD AUTO: 4.71 MILLION/UL (ref 3.81–5.12)
RIGHT ATRIUM AREA SYSTOLE A4C: 12.1 CM2
RIGHT VENTRICLE ID DIMENSION: 2.8 CM
RSV RNA RESP QL NAA+PROBE: NEGATIVE
RV PSP: 20 MMHG
SARS-COV-2 RNA RESP QL NAA+PROBE: NEGATIVE
SL CV LEFT ATRIUM LENGTH A2C: 5.2 CM
SL CV LV EF: 60
SL CV PED ECHO LEFT VENTRICLE DIASTOLIC VOLUME (MOD BIPLANE) 2D: 92 ML
SL CV PED ECHO LEFT VENTRICLE SYSTOLIC VOLUME (MOD BIPLANE) 2D: 34 ML
SODIUM SERPL-SCNC: 138 MMOL/L (ref 135–147)
T WAVE AXIS: 69 DEGREES
TR MAX PG: 15 MMHG
TR PEAK VELOCITY: 1.8 M/S
TRICUSPID ANNULAR PLANE SYSTOLIC EXCURSION: 1.6 CM
TRICUSPID VALVE PEAK REGURGITATION VELOCITY: 1.55 M/S
VENTRICULAR RATE: 110 BPM
WBC # BLD AUTO: 7.5 THOUSAND/UL (ref 4.31–10.16)

## 2024-01-18 PROCEDURE — 93306 TTE W/DOPPLER COMPLETE: CPT | Performed by: STUDENT IN AN ORGANIZED HEALTH CARE EDUCATION/TRAINING PROGRAM

## 2024-01-18 PROCEDURE — 71275 CT ANGIOGRAPHY CHEST: CPT

## 2024-01-18 PROCEDURE — 93005 ELECTROCARDIOGRAM TRACING: CPT

## 2024-01-18 PROCEDURE — 74177 CT ABD & PELVIS W/CONTRAST: CPT

## 2024-01-18 PROCEDURE — 85379 FIBRIN DEGRADATION QUANT: CPT

## 2024-01-18 PROCEDURE — 0241U HB NFCT DS VIR RESP RNA 4 TRGT: CPT

## 2024-01-18 PROCEDURE — G1004 CDSM NDSC: HCPCS

## 2024-01-18 PROCEDURE — 84484 ASSAY OF TROPONIN QUANT: CPT

## 2024-01-18 PROCEDURE — NC001 PR NO CHARGE: Performed by: EMERGENCY MEDICINE

## 2024-01-18 PROCEDURE — 93306 TTE W/DOPPLER COMPLETE: CPT

## 2024-01-18 PROCEDURE — 85025 COMPLETE CBC W/AUTO DIFF WBC: CPT | Performed by: EMERGENCY MEDICINE

## 2024-01-18 PROCEDURE — 80053 COMPREHEN METABOLIC PANEL: CPT | Performed by: EMERGENCY MEDICINE

## 2024-01-18 PROCEDURE — 99233 SBSQ HOSP IP/OBS HIGH 50: CPT | Performed by: EMERGENCY MEDICINE

## 2024-01-18 RX ORDER — HYDRALAZINE HYDROCHLORIDE 20 MG/ML
10 INJECTION INTRAMUSCULAR; INTRAVENOUS EVERY 6 HOURS PRN
Status: DISCONTINUED | OUTPATIENT
Start: 2024-01-18 | End: 2024-01-20 | Stop reason: HOSPADM

## 2024-01-18 RX ORDER — HYDROCHLOROTHIAZIDE 25 MG/1
25 TABLET ORAL DAILY
Status: DISCONTINUED | OUTPATIENT
Start: 2024-01-19 | End: 2024-01-20 | Stop reason: HOSPADM

## 2024-01-18 RX ORDER — BUPRENORPHINE AND NALOXONE 8; 2 MG/1; MG/1
8 FILM, SOLUBLE BUCCAL; SUBLINGUAL ONCE
Status: COMPLETED | OUTPATIENT
Start: 2024-01-18 | End: 2024-01-18

## 2024-01-18 RX ADMIN — HYDRALAZINE HYDROCHLORIDE 5 MG: 20 INJECTION INTRAMUSCULAR; INTRAVENOUS at 04:16

## 2024-01-18 RX ADMIN — CEFEPIME HYDROCHLORIDE 2000 MG: 2 INJECTION, SOLUTION INTRAVENOUS at 15:07

## 2024-01-18 RX ADMIN — BUPRENORPHINE AND NALOXONE 2 MG: 2; .5 FILM BUCCAL; SUBLINGUAL at 00:45

## 2024-01-18 RX ADMIN — BUPRENORPHINE AND NALOXONE 8 MG: 8; 2 FILM BUCCAL; SUBLINGUAL at 12:02

## 2024-01-18 RX ADMIN — MELATONIN TAB 3 MG 6 MG: 3 TAB at 21:53

## 2024-01-18 RX ADMIN — BUPRENORPHINE AND NALOXONE 8 MG: 8; 2 FILM BUCCAL; SUBLINGUAL at 21:48

## 2024-01-18 RX ADMIN — IBUPROFEN 400 MG: 400 TABLET ORAL at 08:28

## 2024-01-18 RX ADMIN — IOHEXOL 100 ML: 350 INJECTION, SOLUTION INTRAVENOUS at 14:03

## 2024-01-18 RX ADMIN — CLONIDINE HYDROCHLORIDE 0.1 MG: 0.1 TABLET ORAL at 16:03

## 2024-01-18 RX ADMIN — SENNOSIDES 8.6 MG: 8.6 TABLET, FILM COATED ORAL at 08:29

## 2024-01-18 RX ADMIN — NICOTINE POLACRILEX 2 MG: 2 GUM, CHEWING ORAL at 15:07

## 2024-01-18 RX ADMIN — BUPRENORPHINE AND NALOXONE 8 MG: 8; 2 FILM BUCCAL; SUBLINGUAL at 08:32

## 2024-01-18 RX ADMIN — GABAPENTIN 300 MG: 300 CAPSULE ORAL at 16:03

## 2024-01-18 RX ADMIN — CLONAZEPAM 1 MG: 0.5 TABLET ORAL at 16:03

## 2024-01-18 RX ADMIN — VANCOMYCIN HYDROCHLORIDE 1000 MG: 1 INJECTION, SOLUTION INTRAVENOUS at 00:45

## 2024-01-18 RX ADMIN — CLONAZEPAM 1 MG: 0.5 TABLET ORAL at 21:53

## 2024-01-18 RX ADMIN — VANCOMYCIN HYDROCHLORIDE 1000 MG: 1 INJECTION, SOLUTION INTRAVENOUS at 12:03

## 2024-01-18 RX ADMIN — NICOTINE POLACRILEX 2 MG: 2 GUM, CHEWING ORAL at 08:56

## 2024-01-18 RX ADMIN — HYDRALAZINE HYDROCHLORIDE 10 MG: 20 INJECTION INTRAMUSCULAR; INTRAVENOUS at 14:07

## 2024-01-18 RX ADMIN — MULTIPLE VITAMINS W/ MINERALS TAB 1 TABLET: TAB ORAL at 08:28

## 2024-01-18 RX ADMIN — CLONAZEPAM 1 MG: 0.5 TABLET ORAL at 04:17

## 2024-01-18 RX ADMIN — CEFEPIME HYDROCHLORIDE 2000 MG: 2 INJECTION, SOLUTION INTRAVENOUS at 02:36

## 2024-01-18 NOTE — PROGRESS NOTES
Karen Mason is a 38 y.o. female who is currently ordered Vancomycin IV with management by the Pharmacy Consult service.  Relevant clinical data and objective / subjective history reviewed.  Vancomycin Assessment:  Indication and Goal AUC/Trough: Soft tissue (goal -600, trough >10)  Clinical Status: stable  Micro: pending  Renal Function:  SCr: 0.75 mg/dL  CrCl: 98.5 mL/min  Days of Therapy: 2  Current Dose: 1000 mg IV q12h  Vancomycin Plan:  New Dosing: dosed appropriate  Estimated AUC: 436 mcg*hr/mL  Estimated Trough: 12.91 mcg/mL  Next Level: 1/19/24 with am labs  Renal Function Monitoring: Daily BMP and UOP  Pharmacy will continue to follow closely for s/sx of nephrotoxicity, infusion reactions and appropriateness of therapy.  BMP and CBC will be ordered per protocol. We will continue to follow the patient’s culture results and clinical progress daily.    Claudia Gonsalez, Pharmacist

## 2024-01-18 NOTE — UTILIZATION REVIEW
Continued Stay Review    Date: 01/18/24                          Current Patient Class: IP  Current Level of Care: Level 4 Medical Detox    HPI:38 y.o. female initially admitted on 1/16.     Assessment/Plan: Pt reports poor sleep, night sweats, chills, joint aches, stomach cramps, anxiety. Tolerating micro-induction of Suboxone thus far and notes her withdrawal symptoms are decreasing. On exam, multiple injection site wounds, wound on R 5th digit. Plan: continue micro-induction of Suboxone & the follow MAT dosing; continue comfort meds, IV ABX, follow blood cultures, Trend labs, replete electrolytes as needed. Telemetry.     Vital Signs:   Date/Time Temp Pulse Resp BP MAP (mmHg) SpO2 O2 Device   01/18/24 0705 97.5 °F (36.4 °C) 89 16 156/70 98 98 % None (Room air)   01/18/24 0412 97.6 °F (36.4 °C) 78 18 185/113 Abnormal  137 98 % None (Room air)   01/17/24 2236 98 °F (36.7 °C) 82 18 171/104 Abnormal  120 96 % None (Room air)   01/17/24 1955 98.2 °F (36.8 °C) 92 18 184/125 Abnormal  -- 98 % None (Room air)   01/17/24 1534 97.6 °F (36.4 °C) 79 18 179/133 Abnormal  -- 98 % --   01/17/24 1112 97.7 °F (36.5 °C) 95 18 158/107 Abnormal  124 98 % None (Room air)       Pertinent Labs/Diagnostic Results:     Results from last 7 days   Lab Units 01/18/24  0410 01/16/24  1159   WBC Thousand/uL 7.50 6.35   HEMOGLOBIN g/dL 12.6 13.6   HEMATOCRIT % 38.5 41.6   PLATELETS Thousands/uL 472* 441*   NEUTROS ABS Thousands/µL 3.78 3.60         Results from last 7 days   Lab Units 01/18/24  0410 01/16/24  1159   SODIUM mmol/L 138 138   POTASSIUM mmol/L 3.5 3.8   CHLORIDE mmol/L 105 103   CO2 mmol/L 23 27   ANION GAP mmol/L 10 8   BUN mg/dL 10 9   CREATININE mg/dL 0.75 0.86   EGFR ml/min/1.73sq m 102 87   CALCIUM mg/dL 9.4 9.5     Results from last 7 days   Lab Units 01/18/24  0410 01/16/24  1159   AST U/L 12* 14   ALT U/L 6* 8   ALK PHOS U/L 59 65   TOTAL PROTEIN g/dL 7.8 7.9   ALBUMIN g/dL 3.7 4.0   TOTAL BILIRUBIN mg/dL 0.47 0.24          Results from last 7 days   Lab Units 01/18/24  0410 01/16/24  1159   GLUCOSE RANDOM mg/dL 77 89     Results from last 7 days   Lab Units 01/18/24  1052   HS TNI 0HR ng/L 7     Results from last 7 days   Lab Units 01/18/24  1052   D-DIMER QUANTITATIVE ug/ml FEU 0.77*     Results from last 7 days   Lab Units 01/17/24  0508   HEP B S AG  Non-reactive   HEP C AB  Reactive*   HEP B C IGM  Non-reactive         Results from last 7 days   Lab Units 01/17/24  0508   CRP mg/L 1.7     Results from last 7 days   Lab Units 01/16/24  1159   ETHANOL LVL mg/dL <10     Results from last 7 days   Lab Units 01/17/24  1242   BLOOD CULTURE  Received in Microbiology Lab. Culture in Progress.  Received in Microbiology Lab. Culture in Progress.         Medications:   Scheduled Medications:  buprenorphine-naloxone, 8 mg, Sublingual, BID  buprenorphine-naloxone, 8 mg, Sublingual, Once  cefepime, 2,000 mg, Intravenous, Q12H  enoxaparin, 40 mg, Subcutaneous, Daily  multivitamin-minerals, 1 tablet, Oral, Daily  nicotine, 1 patch, Transdermal, Daily  senna, 1 tablet, Oral, Daily  transdermal buprenorphine, 20 mcg, Transdermal, Q7 Days  vancomycin, 15 mg/kg, Intravenous, Q12H    Continuous IV Infusions: none    PRN Meds:  clonazePAM, 1 mg, Oral, 4x Daily PRN; 1/17 x1, 1/18 x1  cloNIDine, 0.1 mg, Oral, Q6H PRN; 1/17 x1  gabapentin, 300 mg, Oral, Q8H PRN  hydrALAZINE, 5 mg, Intravenous, Q6H PRN; 1/17 x1, 1/18 x1  ibuprofen, 400 mg, Oral, Q4H PRN; 1/17 x2, 1/18 x1  melatonin, 6 mg, Oral, HS PRN  nicotine polacrilex, 2 mg, Oral, Q2H PRN; 1/17 x3, 1/18 x1  ondansetron, 4 mg, Intravenous, Q6H PRN  traZODone, 50 mg, Oral, HS PRN    Discontinued meds:  buprenorphine (SUBUTEX) 0.5 mg SL tablet (partial) 0.5 mg  Dose: 0.5 mg Freq: Every 2 hours x4 Route: SL  Start: 01/17/24 1200 End: 01/17/24 1752   buprenorphine-naloxone (Suboxone) film 2 mg  Dose: 2 mg Freq: Every 2 hours x4 Route:   Start: 01/17/24 1900 End: 01/18/24 0045       Discharge Plan:  TBD    Network Utilization Review Department  ATTENTION: Please call with any questions or concerns to [phone] and carefully listen to the prompts so that you are directed to the right person. All voicemails are confidential.   For Discharge needs, contact Care Management DC Support Team at [phone] opt. 2  Send all requests for admission clinical reviews, approved or denied determinations and any other requests to dedicated fax number below belonging to the campus where the patient is receiving treatment. List of dedicated fax numbers for the Facilities:  FACILITY NAME UR FAX NUMBER   ADMISSION DENIALS (Administrative/Medical Necessity) [phone]   DISCHARGE SUPPORT TEAM (NETWORK) [phone]   PARENT CHILD HEALTH (Maternity/NICU/Pediatrics) [phone]   [facility] [phone]   [facility] [phone]   [facility] [phone]   [facility] [phone]   [facility] [phone]   [facility] [phone]   General acute hospital [phone]   [facility] [phone]   [facility] [phone]   Critical access hospital [phone]   [facility] [phone]

## 2024-01-18 NOTE — ASSESSMENT & PLAN NOTE
Blood Pressure: (P) 151/92    - Initially likely secondary to withdrawal, however patient was stabilized on maintenance Suboxone yesterday and still continued to have elevated BP readings despite adequate withdrawal management. Required PRN IV hydralazine and Clonodine  - Internal Med was consulted for further recommendations  - Per chart review patient was previously prescribed HCTZ 25 mg. Reports she has not taken any blood pressure medication in the last 3 years.   - Will restart HCTZ and continue to monitor BP

## 2024-01-18 NOTE — PROGRESS NOTES
"PROGRESS NOTE  DEPARTMENT OF MEDICAL TOXICOLOGY  LEVEL 4 MEDICAL DETOX UNIT  Karen Mason 38 y.o. female MRN: [id number]  Unit/Bed#:  DETOX 510-01 Encounter: [id number]      Reason for Admission/Principal Problem: Opoid withdrawal  Rounding Provider: Joaquín Rodriguez MD  Attending Provider: Fam Moreno,    1/16/2024 11:20 AM           * Opioid withdrawal (HCC)  Assessment & Plan  See \"Opioid Use Disorder\"  Using IV heroin 6-10 bags daily, last use around 0100  on 1/16  Current withdrawal symptoms include chills, sweats, anxiety, hypertension  Butrans patch placed on admission on 1/16  Continue microinduction w/ Suboxone 8 mg bid  Continue comfort meds for symptoms including anxiety meds gabapentin, clonidine, klonopin and zofran for nausea  Case management consulted for dispo plan, pt unsure about inpatient rehab at this point, may d/c w/ [facility] referral if going home    Opioid use disorder  Assessment & Plan  Patient admits to using IV heroin for the past 3 years straight, has not been sober in these past 3 years  She states she is using approximately 6-10 bags daily, states she tries not to use more than 1 bundle per day  Last use was around 1 AM on 1/16/24  She was on Suboxone years ago, only had it filled 1 time. Was also at methadone clinic recently and followed for 3 days only   Upon reviewing PDMP, last and only Suboxone prescription was filled on 2/1/2023 for 8 day supply   Agreeable to suboxone treatment  HIV/hepatitis panel pending  Initiate MAT protocol - See \"Opioid Withdrawal\"    Intravenous drug abuse (HCC)  Assessment & Plan  Patient has multiple injection sites on bilateral legs and arms, healing wound seen on right 5th digit (Patient says the wound has been present for about a month and appears to be slowly healing following initiation of the abx)  Denies any fevers, WBC count is normal - No evidence of acute infection at this time  Continue to monitor injection sites for signs of " "infection  HIV non-reactive  Hep C reactive (see \"Hepatitis C antibody positive in blood\")  Hep B nonreactive  Tetanus shot ordered  Obtain repeat CMP & CBC tomorrow am   Concern for possible soft tissue infection/osteomyelitis and bacteremia:  R hand Xray shows no evidence of osteomyelitis or significant soft tissue damage  CRP wnl  Blood cultures pending  Continue empiric Vancomycin and Cefepime pending cultures, pharmacy following, if prelim blood cultures are negative, will d/c IV abx and transition to oral abx on discharge      High blood pressure  Assessment & Plan  Blood Pressure: 156/70    Likely secondary to withdrawal  Received Hydralazine 5 mg IV on 1/18    Plan:  Monitor blood pressure  PRN IV Hydralazine for SBP > 160     Hepatitis C antibody positive in blood  Assessment & Plan  HEPATITIS C ANTIBODY IGM + IGG WITH REFLEX TO HCV PCR  Component  Ref Range & Units 1/17/20 1040   HEPATITIS C ANTIBODY  NON-REACTIVE REACTIVE Abnormal    SIGNAL TO CUT-OFF  <1.00 32.80 High       HCV RNA, QUANTITATIVE REAL TIME PCR  NOT DETECTED IU/mL 1,180,000 High    HCV RNA, QUANTITATIVE REAL TIME PCR  NOT DETECTED Log IU/mL 6.07 High        F/u w/ GI or Hepatologist on d/c, obtain outpatient RNA quant test    Cocaine abuse (HCC)  Assessment & Plan  Patient admits to smoking cocaine approximately twice weekly  She has been hypertensive throughout encounter likely secondary to anxiety  Denies any chest pain but admits to mild SOB with anxiety  EKG performed in the ED and reviewed by myself- no evidence of acute ischemia  UDS screen is pending  Encourage cessation, continue supportive care/comfort meds    Tobacco use disorder  Assessment & Plan  Patient admits to smoking approximately 12 cigarettes/day  Encourage cessation  Will order nicotine patch     Tachycardia  Assessment & Plan  Obtain EKG, troponins, d-dimer and echo            VTE Pharmacologic Prophylaxis:   Pharmacologic: Enoxaparin (Lovenox)  Mechanical VTE " Prophylaxis in Place: yes    Code Status: Level 1 - Full Code    Patient Centered Rounds: I have performed bedside rounds with nursing staff today.    Discussions with Specialists or Other Care Team Provider: None     Education and Discussions with Family / Patient: Yes    Time Spent for Care: 30 minutes.  More than 50% of total time spent on counseling and coordination of care as described above.    Current Length of Stay: 2 day(s)    Current Patient Status: Inpatient     Certification Statement: The patient will continue to require additional inpatient hospital stay due to opoid withdrawal and possible soft tissue infection/suspicion of bacteremia  Discharge Plan: home w/ outpatient services vs inpatient rehab        Subjective:   Patient reports poor sleep overnight along with night sweats and chills, joint aches, stomach cramps and anxiety., She denies any nausea, vomiting, runny nose or tremors. She is tolerating the microinduction fairly well and says that her withdrawal symptoms are decreasing. Patient says that she also believes her finger lesion is starting to heal following the antibiotics.     Objective:     Clinical Opiate Withdrawal Scale  Pulse: 89  Resting Pulse Rate: Measured After Patient is Sitting or Lying for One Minute: Pulse rate   GI Upset: Over Last Half Hour: No GI symptoms  Sweating: Over Past Half Hour Not Accounted for by Room Temperature of Patient Activity: Subjective report of chills or flushing  Tremor: Observation of Outstretched Hands: Tremor can be felt, but not observed  Restlessness: Observation During Assessment: Reports difficulty sitting still, but is able to do so  Yawning: Observation During Assessment: No yawning  Pupil Size: Pupils possibly larger than normal for room light  Anxiety and Irritability: Patient reports increasing irritability or anxiousness  Bone or Joint Aches: If Patient was Having Pain Previously, Only the Additional Component Attributed to Opiate  Withdrawal is Scored: Mild diffuse discomfort  Gooseflesh Skin: Piloerection of skin can be felt or hairs standing up on arms  Runny Nose or Tearing: Not Accounted for by Cold Symptoms or Allergies: Nasal stuffiness of unusually moist eyes  Clinical Opiate Withdrawal Scale Total Score: 11    No data recorded      Last 24 Hours Medication List:   Current Facility-Administered Medications   Medication Dose Route Frequency Provider Last Rate    buprenorphine-naloxone  8 mg Sublingual BID Caterina Ontiveros PA-C      cefepime  2,000 mg Intravenous Q12H Joaquín Rodriguez MD 2,000 mg (24 0236)    clonazePAM  1 mg Oral 4x Daily PRN Patti Mitchell PA-C      cloNIDine  0.1 mg Oral Q6H PRN Patti Mitchell PA-C      enoxaparin  40 mg Subcutaneous Daily Patti Mitchell PA-C      gabapentin  300 mg Oral Q8H PRN Patti Mitchell PA-C      hydrALAZINE  5 mg Intravenous Q6H PRN Caterina Ontiveros PA-C      ibuprofen  400 mg Oral Q4H PRN Patti Mitchell PA-C      melatonin  6 mg Oral HS PRN Gloria Flores PA-C      multivitamin-minerals  1 tablet Oral Daily Patti Mitchell PA-C      nicotine  1 patch Transdermal Daily Patti Mitchell PA-C      nicotine polacrilex  2 mg Oral Q2H PRN Patti Mitchell PA-C      ondansetron  4 mg Intravenous Q6H PRN Patti Mitchell PA-C      senna  1 tablet Oral Daily Patti Mitchell PA-C      transdermal buprenorphine  20 mcg Transdermal Q7 Days Patti Mitchell PA-C      traZODone  50 mg Oral HS PRN Patti Mitchell PA-C      vancomycin  15 mg/kg Intravenous Q12H Joaquín Rodriguez MD 1,000 mg (24 0045)         Vitals:   Temp (24hrs), Av.8 °F (36.6 °C), Min:97.5 °F (36.4 °C), Max:98.2 °F (36.8 °C)    Temp:  [97.5 °F (36.4 °C)-98.2 °F (36.8 °C)] 97.5 °F (36.4 °C)  HR:  [78-95] 89  Resp:  [16-18] 16  BP: (156-185)/() 156/70  SpO2:  [96 %-98 %] 98 %  Body mass index is 28.23 kg/m².     Input and  Output Summary (last 24 hours):No intake or output data in the 24 hours ending 01/18/24 1039    Physical Exam:   Physical Exam  Vitals and nursing note reviewed.   Constitutional:       General: She is not in acute distress.     Appearance: She is well-developed.   HENT:      Head: Normocephalic and atraumatic.   Eyes:      Conjunctiva/sclera: Conjunctivae normal.   Cardiovascular:      Rate and Rhythm: Normal rate and regular rhythm.      Heart sounds: No murmur heard.  Pulmonary:      Effort: Pulmonary effort is normal. No respiratory distress.      Breath sounds: Normal breath sounds.   Abdominal:      Palpations: Abdomen is soft.      Tenderness: There is no abdominal tenderness.   Musculoskeletal:         General: No swelling.      Cervical back: Neck supple.   Skin:     General: Skin is warm and dry.      Capillary Refill: Capillary refill takes less than 2 seconds.      Comments: Patient has multiple injection sites on bilateral legs and arms, healing wound with decreased erythema and swelling seen on right 5th digit   Neurological:      Mental Status: She is alert.   Psychiatric:         Mood and Affect: Mood normal.         Additional Data:     Labs:   Results from last 7 days   Lab Units 01/18/24  0410   WBC Thousand/uL 7.50   HEMOGLOBIN g/dL 12.6   HEMATOCRIT % 38.5   PLATELETS Thousands/uL 472*   NEUTROS PCT % 51   LYMPHS PCT % 38   MONOS PCT % 10   EOS PCT % 1      Results from last 7 days   Lab Units 01/18/24  0410   SODIUM mmol/L 138   POTASSIUM mmol/L 3.5   CHLORIDE mmol/L 105   CO2 mmol/L 23   BUN mg/dL 10   CREATININE mg/dL 0.75   ANION GAP mmol/L 10   CALCIUM mg/dL 9.4   ALBUMIN g/dL 3.7   TOTAL BILIRUBIN mg/dL 0.47   ALK PHOS U/L 59   ALT U/L 6*   AST U/L 12*   GLUCOSE RANDOM mg/dL 77                              * I Have Reviewed All Lab Data Listed Above.  * Additional Pertinent Lab Tests Reviewed: All Labs For Current Hospital Admission Reviewed      Imaging Studies: I have personally reviewed  pertinent reports.        Recent Cultures (last 7 days):  Results from last 7 days   Lab Units 01/17/24  1242   BLOOD CULTURE  Received in Microbiology Lab. Culture in Progress.  Received in Microbiology Lab. Culture in Progress.         Today, Patient Was Seen By: Joaquín Rodriguez MD    ** Please Note: Dictation voice to text software may have been used in the creation of this document. **

## 2024-01-18 NOTE — PLAN OF CARE
Problem: SAFETY ADULT  Goal: Patient will remain free of falls  Description: INTERVENTIONS:  - Educate patient/family on patient safety including physical limitations  - Instruct patient to call for assistance with activity   - Consult OT/PT to assist with strengthening/mobility   - Keep Call bell within reach  - Keep bed low and locked with side rails adjusted as appropriate  - Keep care items and personal belongings within reach  - Initiate and maintain comfort rounds  - Make Fall Risk Sign visible to staff  - Apply yellow socks and bracelet for high fall risk patients  - Consider moving patient to room near nurses station  Outcome: Progressing  Goal: Maintain or return to baseline ADL function  Description: INTERVENTIONS:  -  Assess patient's ability to carry out ADLs; assess patient's baseline for ADL function and identify physical deficits which impact ability to perform ADLs (bathing, care of mouth/teeth, toileting, grooming, dressing, etc.)  - Assess/evaluate cause of self-care deficits   - Assess range of motion  - Assess patient's mobility; develop plan if impaired  - Assess patient's need for assistive devices and provide as appropriate  - Encourage maximum independence but intervene and supervise when necessary  - Involve family in performance of ADLs  - Assess for home care needs following discharge   - Consider OT consult to assist with ADL evaluation and planning for discharge  - Provide patient education as appropriate  Outcome: Progressing  Goal: Maintains/Returns to pre admission functional level  Description: INTERVENTIONS:  - Perform AM-PAC 6 Click Basic Mobility/ Daily Activity assessment daily.  - Set and communicate daily mobility goal to care team and patient/family/caregiver.   - Collaborate with rehabilitation services on mobility goals if consulted  - Out of bed for toileting  - Record patient progress and toleration of activity level   Outcome: Progressing     Problem: DISCHARGE  PLANNING  Goal: Discharge to home or other facility with appropriate resources  Description: INTERVENTIONS:  - Identify barriers to discharge w/patient and caregiver  - Arrange for needed discharge resources and transportation as appropriate  - Identify discharge learning needs (meds, wound care, etc.)  - Arrange for interpretive services to assist at discharge as needed  - Refer to Case Management Department for coordinating discharge planning if the patient needs post-hospital services based on physician/advanced practitioner order or complex needs related to functional status, cognitive ability, or social support system  Outcome: Progressing     Problem: Knowledge Deficit  Goal: Patient/family/caregiver demonstrates understanding of disease process, treatment plan, medications, and discharge instructions  Description: Complete learning assessment and assess knowledge base.  Interventions:  - Provide teaching at level of understanding  - Provide teaching via preferred learning methods  Outcome: Progressing     Problem: SUBSTANCE USE/ABUSE  Goal: By discharge, will develop insight into their chemical dependency and sustain motivation to continue in recovery  Description: INTERVENTIONS:  - Attends all daily group sessions and scheduled AA groups  - Actively practices coping skills through participation in the therapeutic community and adherence to program rules  - Reviews and completes assignments from individual treatment plan  - Assist patient development of understanding of their personal cycle of addiction and relapse triggers  Outcome: Progressing  Goal: By discharge, patient will have ongoing treatment plan addressing chemical dependency  Description: INTERVENTIONS:  - Assist patient with resources and/or appointments for ongoing recovery based living  Outcome: Progressing

## 2024-01-18 NOTE — ASSESSMENT & PLAN NOTE
Obtain EKG- no acute ST elevations   Troponin 0HR- 7, 2-HR 11, 4- 10   d-dimer: 0.77  CTA head and chest: No central pulmonary embolus. No right heart strain.   No further evidence of tachycardia. Patient denies any chest pain or shortness of breathe.

## 2024-01-19 ENCOUNTER — APPOINTMENT (INPATIENT)
Dept: ULTRASOUND IMAGING | Facility: HOSPITAL | Age: 39
DRG: 773 | End: 2024-01-19
Payer: MEDICARE

## 2024-01-19 PROBLEM — D73.89 SPLENIC LESION: Status: ACTIVE | Noted: 2024-01-19

## 2024-01-19 PROBLEM — F11.93 OPIOID WITHDRAWAL (HCC): Status: RESOLVED | Noted: 2024-01-16 | Resolved: 2024-01-19

## 2024-01-19 LAB
AMPHETAMINES SERPL QL SCN: POSITIVE
ANION GAP SERPL CALCULATED.3IONS-SCNC: 9 MMOL/L
BARBITURATES UR QL: NEGATIVE
BASOPHILS # BLD AUTO: 0.03 THOUSANDS/ÂΜL (ref 0–0.1)
BASOPHILS NFR BLD AUTO: 1 % (ref 0–1)
BENZODIAZ UR QL: POSITIVE
BUN SERPL-MCNC: 13 MG/DL (ref 5–25)
CALCIUM SERPL-MCNC: 9.7 MG/DL (ref 8.4–10.2)
CHLORIDE SERPL-SCNC: 106 MMOL/L (ref 96–108)
CO2 SERPL-SCNC: 23 MMOL/L (ref 21–32)
COCAINE UR QL: POSITIVE
CREAT SERPL-MCNC: 0.82 MG/DL (ref 0.6–1.3)
EOSINOPHIL # BLD AUTO: 0.12 THOUSAND/ÂΜL (ref 0–0.61)
EOSINOPHIL NFR BLD AUTO: 2 % (ref 0–6)
ERYTHROCYTE [DISTWIDTH] IN BLOOD BY AUTOMATED COUNT: 15.8 % (ref 11.6–15.1)
GFR SERPL CREATININE-BSD FRML MDRD: 92 ML/MIN/1.73SQ M
GLUCOSE SERPL-MCNC: 87 MG/DL (ref 65–140)
HCT VFR BLD AUTO: 40 % (ref 34.8–46.1)
HGB BLD-MCNC: 12.9 G/DL (ref 11.5–15.4)
IMM GRANULOCYTES # BLD AUTO: 0.01 THOUSAND/UL (ref 0–0.2)
IMM GRANULOCYTES NFR BLD AUTO: 0 % (ref 0–2)
LYMPHOCYTES # BLD AUTO: 2.79 THOUSANDS/ÂΜL (ref 0.6–4.47)
LYMPHOCYTES NFR BLD AUTO: 44 % (ref 14–44)
MAGNESIUM SERPL-MCNC: 2.3 MG/DL (ref 1.9–2.7)
MCH RBC QN AUTO: 26.8 PG (ref 26.8–34.3)
MCHC RBC AUTO-ENTMCNC: 32.3 G/DL (ref 31.4–37.4)
MCV RBC AUTO: 83 FL (ref 82–98)
METHADONE UR QL: NEGATIVE
MONOCYTES # BLD AUTO: 0.65 THOUSAND/ÂΜL (ref 0.17–1.22)
MONOCYTES NFR BLD AUTO: 11 % (ref 4–12)
NEUTROPHILS # BLD AUTO: 2.56 THOUSANDS/ÂΜL (ref 1.85–7.62)
NEUTS SEG NFR BLD AUTO: 42 % (ref 43–75)
NRBC BLD AUTO-RTO: 0 /100 WBCS
OPIATES UR QL SCN: NEGATIVE
OXYCODONE+OXYMORPHONE UR QL SCN: NEGATIVE
PCP UR QL: NEGATIVE
PLATELET # BLD AUTO: 447 THOUSANDS/UL (ref 149–390)
PMV BLD AUTO: 8.9 FL (ref 8.9–12.7)
POTASSIUM SERPL-SCNC: 4.7 MMOL/L (ref 3.5–5.3)
RBC # BLD AUTO: 4.82 MILLION/UL (ref 3.81–5.12)
SODIUM SERPL-SCNC: 138 MMOL/L (ref 135–147)
THC UR QL: NEGATIVE
VANCOMYCIN SERPL-MCNC: 22.5 UG/ML (ref 10–20)
WBC # BLD AUTO: 6.16 THOUSAND/UL (ref 4.31–10.16)

## 2024-01-19 PROCEDURE — 80202 ASSAY OF VANCOMYCIN: CPT

## 2024-01-19 PROCEDURE — 83735 ASSAY OF MAGNESIUM: CPT

## 2024-01-19 PROCEDURE — 80048 BASIC METABOLIC PNL TOTAL CA: CPT

## 2024-01-19 PROCEDURE — 85025 COMPLETE CBC W/AUTO DIFF WBC: CPT

## 2024-01-19 PROCEDURE — 80307 DRUG TEST PRSMV CHEM ANLYZR: CPT | Performed by: EMERGENCY MEDICINE

## 2024-01-19 PROCEDURE — 76705 ECHO EXAM OF ABDOMEN: CPT

## 2024-01-19 PROCEDURE — 99232 SBSQ HOSP IP/OBS MODERATE 35: CPT

## 2024-01-19 RX ORDER — CEPHALEXIN 500 MG/1
500 CAPSULE ORAL EVERY 6 HOURS SCHEDULED
Status: DISCONTINUED | OUTPATIENT
Start: 2024-01-19 | End: 2024-01-20 | Stop reason: HOSPADM

## 2024-01-19 RX ORDER — SULFAMETHOXAZOLE AND TRIMETHOPRIM 800; 160 MG/1; MG/1
1 TABLET ORAL EVERY 12 HOURS SCHEDULED
Status: DISCONTINUED | OUTPATIENT
Start: 2024-01-19 | End: 2024-01-20 | Stop reason: HOSPADM

## 2024-01-19 RX ORDER — AMLODIPINE BESYLATE 5 MG/1
5 TABLET ORAL DAILY
Status: DISCONTINUED | OUTPATIENT
Start: 2024-01-19 | End: 2024-01-20 | Stop reason: HOSPADM

## 2024-01-19 RX ADMIN — NICOTINE POLACRILEX 2 MG: 2 GUM, CHEWING ORAL at 15:22

## 2024-01-19 RX ADMIN — CEPHALEXIN 500 MG: 500 CAPSULE ORAL at 11:25

## 2024-01-19 RX ADMIN — NICOTINE POLACRILEX 2 MG: 2 GUM, CHEWING ORAL at 19:55

## 2024-01-19 RX ADMIN — NICOTINE POLACRILEX 2 MG: 2 GUM, CHEWING ORAL at 08:28

## 2024-01-19 RX ADMIN — AMLODIPINE BESYLATE 5 MG: 5 TABLET ORAL at 18:03

## 2024-01-19 RX ADMIN — CEPHALEXIN 500 MG: 500 CAPSULE ORAL at 23:16

## 2024-01-19 RX ADMIN — CEPHALEXIN 500 MG: 500 CAPSULE ORAL at 18:03

## 2024-01-19 RX ADMIN — SENNOSIDES 8.6 MG: 8.6 TABLET, FILM COATED ORAL at 08:20

## 2024-01-19 RX ADMIN — BUPRENORPHINE AND NALOXONE 8 MG: 8; 2 FILM BUCCAL; SUBLINGUAL at 08:20

## 2024-01-19 RX ADMIN — NICOTINE POLACRILEX 2 MG: 2 GUM, CHEWING ORAL at 12:07

## 2024-01-19 RX ADMIN — HYDRALAZINE HYDROCHLORIDE 10 MG: 20 INJECTION INTRAMUSCULAR; INTRAVENOUS at 08:28

## 2024-01-19 RX ADMIN — NICOTINE POLACRILEX 2 MG: 2 GUM, CHEWING ORAL at 05:20

## 2024-01-19 RX ADMIN — CEFEPIME HYDROCHLORIDE 2000 MG: 2 INJECTION, SOLUTION INTRAVENOUS at 02:31

## 2024-01-19 RX ADMIN — SULFAMETHOXAZOLE AND TRIMETHOPRIM 1 TABLET: 800; 160 TABLET ORAL at 19:55

## 2024-01-19 RX ADMIN — VANCOMYCIN HYDROCHLORIDE 1000 MG: 1 INJECTION, SOLUTION INTRAVENOUS at 00:42

## 2024-01-19 RX ADMIN — TRAZODONE HYDROCHLORIDE 50 MG: 50 TABLET ORAL at 19:55

## 2024-01-19 RX ADMIN — MELATONIN TAB 3 MG 6 MG: 3 TAB at 19:55

## 2024-01-19 RX ADMIN — BUPRENORPHINE AND NALOXONE 8 MG: 8; 2 FILM BUCCAL; SUBLINGUAL at 19:55

## 2024-01-19 RX ADMIN — SULFAMETHOXAZOLE AND TRIMETHOPRIM 1 TABLET: 800; 160 TABLET ORAL at 11:42

## 2024-01-19 RX ADMIN — HYDROCHLOROTHIAZIDE 25 MG: 25 TABLET ORAL at 08:21

## 2024-01-19 RX ADMIN — GABAPENTIN 300 MG: 300 CAPSULE ORAL at 18:19

## 2024-01-19 RX ADMIN — MULTIPLE VITAMINS W/ MINERALS TAB 1 TABLET: TAB ORAL at 08:20

## 2024-01-19 NOTE — ASSESSMENT & PLAN NOTE
"CTA chest abdomen pelvis 1/19/2024: \"No central pulmonary embolus. No right heart strain. Ill-defined low-attenuation lesion in the spleen. Nonemergent follow-up splenic ultrasound recommended to exclude cyst. The study was marked in EPIC for immediate notification. Otherwise unremarkable abdomen and pelvis. \"  LUQ U/S 1/19/2024: \"Noncystic heterogeneous lobulated hypodense lesion in the periphery of the lower pole of the spleen corresponding to the area of hypodensity in that location on recent CT. If there is no history of primary malignancy or clinical suspicion of lymphoma then in a 38-year-old female this is statistically most likely to represent a benign finding. In that instance, close interval ultrasound follow-up with next examination to be performed in 6 months would likely be sufficient.\"  Patient informed of finding and need for follow-up U/S in 6 months, patient expresses understanding  Recommend outpatient f/u PCP  "

## 2024-01-19 NOTE — PROGRESS NOTES
Karen Mason is a 38 y.o. female who is currently ordered Vancomycin IV with management by the Pharmacy Consult service.  Relevant clinical data and objective / subjective history reviewed.  Vancomycin Assessment:  Indication and Goal AUC/Trough: Soft tissue (goal -600, trough >10), -600, trough >10  Clinical Status: improving  Micro:     Renal Function:  SCr: 0.82 mg/dL  CrCl: 93.7 mL/min  Renal replacement: Not on dialysis  Days of Therapy: 3  Current Dose: 1000mg q12h  Vancomycin Plan:  New Dosing: same dose  Estimated AUC: 545 mcg*hr/mL  Estimated Trough: 16.7 mcg/mL  Next Level:  1/26/24   Renal Function Monitoring: Daily BMP and UOP  Pharmacy will continue to follow closely for s/sx of nephrotoxicity, infusion reactions and appropriateness of therapy.  BMP and CBC will be ordered per protocol. We will continue to follow the patient’s culture results and clinical progress daily.    Katherin Hogue, Pharmacist

## 2024-01-19 NOTE — UTILIZATION REVIEW
Continued Stay Review    Date: 1/19                          Current Patient Class: IP  Current Level of Care: Detox Unit    HPI:38 y.o. female initially admitted on 1/16     Assessment/Plan:     Vancomycin d/c today. Current withdrawal symptoms include chills, sweats, anxiety, HTN - using IV Hydralazine increased to 10 mg today.  Continues on Suboxone 8 mf BID.  On exam denies withdrawal symptoms today.  No further c/o CP. COWS 3.  Has flushed, moistness on face, sitting still, no tremor, A&O X 3.      Vital Signs:   Date/Time Temp Pulse Resp BP MAP (mmHg) SpO2 O2 Device Patient Position - Orthostatic VS   01/19/24 0824 97.6 °F (36.4 °C) -- -- -- -- -- -- --   01/19/24 0500 97.3 °F (36.3 °C) Abnormal  86 16 177/119 Abnormal  138 97 % None (Room air) Lying   01/18/24 2235 96.9 °F (36.1 °C) Abnormal  80 16 150/84 106 96 % -- Lying   01/18/24 1937 97.2 °F (36.2 °C) Abnormal  78 16 149/87 107 96 % -- Lying   01/18/24 1720 -- 84 16 152/87 -- 95 % None (Room air) --   01/18/24 1453 97.7 °F (36.5 °C) 87 16 181/109 Abnormal  -- 98 % None (Room air) --   01/18/24 1410 -- 80 16 179/116 Abnormal  -- 98 % None (Room air) --   01/18/24 1335 -- 95 16 204/129 Abnormal  -- 98 % None (Room air) --   01/18/24 1230 97.3 °F (36.3 °C) Abnormal  85 16 172/126 Abnormal  -- 96 % None (Room air) --   01/18/24 1130 97.3 °F (36.3 °C) Abnormal  97 16 172/126 Abnormal  -- 96 % None (Room air) --   01/18/24 0705 97.5 °F (36.4 °C) 89 16 156/70 98 98 % None (Room air) Lying   01/18/24 0412 97.6 °F (36.4 °C) 78 18 185/113 Abnormal  137 98 % None (Room air) Lying   01/17/24 2236 98 °F (36.7 °C) 82 18 171/104 Abnormal  120 96 % None (Room air) Lying   01/17/24 1955 98.2 °F (36.8 °C) 92 18 184/125 Abnormal  -- 98 % None (Room air) Lying   01/17/24 1534 97.6 °F (36.4 °C) 79 18 179/133 Abnormal  -- 98 % -- Lying   01/17/24 1112 97.7 °F (36.5 °C) 95 18 158/107 Abnormal  124 98 % None (Room air) Lying   01/17/24 0848 97.3 °F (36.3 °C) Abnormal  86 16  184/120 Abnormal  -- 100 % None (Room air) --       Pertinent Labs/Diagnostic Results:   Results from last 7 days   Lab Units 01/18/24  1056   SARS-COV-2  Negative     Results from last 7 days   Lab Units 01/19/24  0517 01/18/24  0410 01/16/24  1159   WBC Thousand/uL 6.16 7.50 6.35   HEMOGLOBIN g/dL 12.9 12.6 13.6   HEMATOCRIT % 40.0 38.5 41.6   PLATELETS Thousands/uL 447* 472* 441*   NEUTROS ABS Thousands/µL 2.56 3.78 3.60         Results from last 7 days   Lab Units 01/19/24  0517 01/18/24  0410 01/16/24  1159   SODIUM mmol/L 138 138 138   POTASSIUM mmol/L 4.7 3.5 3.8   CHLORIDE mmol/L 106 105 103   CO2 mmol/L 23 23 27   ANION GAP mmol/L 9 10 8   BUN mg/dL 13 10 9   CREATININE mg/dL 0.82 0.75 0.86   EGFR ml/min/1.73sq m 92 102 87   CALCIUM mg/dL 9.7 9.4 9.5   MAGNESIUM mg/dL 2.3  --   --      Results from last 7 days   Lab Units 01/18/24  0410 01/16/24  1159   AST U/L 12* 14   ALT U/L 6* 8   ALK PHOS U/L 59 65   TOTAL PROTEIN g/dL 7.8 7.9   ALBUMIN g/dL 3.7 4.0   TOTAL BILIRUBIN mg/dL 0.47 0.24         Results from last 7 days   Lab Units 01/19/24  0517 01/18/24  0410 01/16/24  1159   GLUCOSE RANDOM mg/dL 87 77 89     Results from last 7 days   Lab Units 01/18/24  1543 01/18/24  1258 01/18/24  1052   HS TNI 0HR ng/L  --   --  7   HS TNI 2HR ng/L  --  11  --    HSTNI D2 ng/L  --  4  --    HS TNI 4HR ng/L 10  --   --    HSTNI D4 ng/L 3  --   --      Results from last 7 days   Lab Units 01/18/24  1052   D-DIMER QUANTITATIVE ug/ml FEU 0.77*     Results from last 7 days   Lab Units 01/17/24  0508   HEP B S AG  Non-reactive   HEP C AB  Reactive*   HEP B C IGM  Non-reactive         Results from last 7 days   Lab Units 01/17/24  0508   CRP mg/L 1.7                 Results from last 7 days   Lab Units 01/18/24  1056   INFLUENZA A PCR  Negative   INFLUENZA B PCR  Negative   RSV PCR  Negative         Results from last 7 days   Lab Units 01/19/24  0519   AMPH/METH  Positive*   BARBITURATE UR  Negative   BENZODIAZEPINE UR   Positive*   COCAINE UR  Positive*   METHADONE URINE  Negative   OPIATE UR  Negative   PCP UR  Negative   THC UR  Negative     Results from last 7 days   Lab Units 01/16/24  1159   ETHANOL LVL mg/dL <10                 Results from last 7 days   Lab Units 01/17/24  1242   BLOOD CULTURE  No Growth at 24 hrs.  No Growth at 24 hrs.             Results from last 7 days   Lab Units 01/19/24  0517   VANCOMYCIN RM ug/mL 22.5*       Medications:   Scheduled Medications:  buprenorphine-naloxone, 8 mg, Sublingual, BID  cephalexin, 500 mg, Oral, Q6H ELY  enoxaparin, 40 mg, Subcutaneous, Daily  hydrochlorothiazide, 25 mg, Oral, Daily  multivitamin-minerals, 1 tablet, Oral, Daily  nicotine, 1 patch, Transdermal, Daily  senna, 1 tablet, Oral, Daily  sulfamethoxazole-trimethoprim, 1 tablet, Oral, Q12H ELY      Continuous IV Infusions:     PRN Meds:  cloNIDine, 0.1 mg, Oral, Q6H PRN - x 1 1/18  gabapentin, 300 mg, Oral, Q8H PRN - x 1 1/18  hydrALAZINE, 10 mg, Intravenous, Q6H PRN - x 1 1/18, 1/19  hydrALAZINE, 5 mg, Intravenous, Q6H PRN - x 1 1/18  ibuprofen, 400 mg, Oral, Q4H PRN -x 1 1/18  melatonin, 6 mg, Oral, HS PRN - x 1 1/18  nicotine polacrilex, 2 mg, Oral, Q2H PRN x 2 1/18, 1/19  ondansetron, 4 mg, Intravenous, Q6H PRN  traZODone, 50 mg, Oral, HS PRN  Clonazepam x 3 1/18 and d/c 1/19    Discharge Plan: D    Network Utilization Review Department  ATTENTION: Please call with any questions or concerns to [phone] and carefully listen to the prompts so that you are directed to the right person. All voicemails are confidential.   For Discharge needs, contact Care Management DC Support Team at [phone] opt. [phone]  Send all requests for admission clinical reviews, approved or denied determinations and any other requests to dedicated fax number below belonging to the campus where the patient is receiving treatment. List of dedicated fax numbers for the Facilities:  FACILITY NAME UR FAX NUMBER   ADMISSION DENIALS  (Administrative/Medical Necessity) [phone]   DISCHARGE SUPPORT TEAM (NETWORK) [phone]   PARENT CHILD HEALTH (Maternity/NICU/Pediatrics) [phone]   [facility] [phone]   [facility] [phone]   [facility] [phone]   [facility] [phone]   [facility] [phone]   [facility] [phone]   [facility] [phone]   [facility] [phone]   [facility] [phone]   [facility] [phone]   [facility] [phone]

## 2024-01-19 NOTE — DISCHARGE SUMMARY
[facility]  Discharge- Karen Mason 1985, 38 y.o. female MRN: [id number]  Unit/Bed#: 5T DETOX 510-01 Encounter: [id number]  Primary Care Provider: Radu Adair MD   Date and time admitted to hospital: 1/16/2024 11:20 AM    MEDICAL DETOX UNIT, LEVEL 4  Department of Medical Toxicology  Reason for Admission/Principal Problem: opioid withdrawal, opioid use disorder   Admitting provider: KAYLENE Ramirez MD   1/16/2024 11:20 AM     Discharging Physician / Practitioner: Marisol Urrutia PA-C  PCP: Radu Adair MD  Admission Date:   Admission Orders (From admission, onward)       Ordered        01/16/24 1509  INPATIENT ADMISSION  Once                          Discharge Date: 01/20/24    Medical Problems       Resolved Problems  Date Reviewed: 1/19/2024            Resolved    Opioid withdrawal (HCC) 1/19/2024     Resolved by  Marisol Urrutia PA-C    Tachycardia 1/20/2024     Resolved by  Marisol Urrutia PA-C    Overview Deleted 1/18/2024 10:33 AM by Joaquín Rodriguez MD                  * Cellulitis of right hand  Assessment & Plan  healing wound seen on right 5th digit (Previous injection site) (  Patient says the wound has been present for about a month and appears to be slowly healing following initiation of the abx  no diffuse erythema on exam today, no TTP, neurovascularly intact  VSS (afebrile). No leukocytosis on CBC  Tetanus shot administered 1/16/2024  R hand Xray 1/17/2024: no evidence of osteomyelitis or significant soft tissue damage  Blood cultures- no growth at 48 hours   Transitioned to PO antibiotics yesterday, continue on d/c for 10 days total     Opioid withdrawal (HCC)-resolved as of 1/19/2024  Assessment & Plan  Using IV heroin 6-10 bags daily, last use around 0100  on 1/16  Suboxone micro-induction completed 1/17-1/18  Currently tolerating maintenance Suboxone 8 mg BID   Acute withdrawal resolved     Splenic lesion  Assessment &  "Plan  CTA chest abdomen pelvis 1/19/2024: \"No central pulmonary embolus. No right heart strain. Ill-defined low-attenuation lesion in the spleen. Nonemergent follow-up splenic ultrasound recommended to exclude cyst. The study was marked in EPIC for immediate notification. Otherwise unremarkable abdomen and pelvis. \"  LUQ U/S 1/19/2024: \"Noncystic heterogeneous lobulated hypodense lesion in the periphery of the lower pole of the spleen corresponding to the area of hypodensity in that location on recent CT. If there is no history of primary malignancy or clinical suspicion of lymphoma then in a 38-year-old female this is statistically most likely to represent a benign finding. In that instance, close interval ultrasound follow-up with next examination to be performed in 6 months would likely be sufficient.\"  Patient informed of finding and need for follow-up U/S in 6 months, patient expresses understanding  Recommend outpatient f/u PCP    Hypertension  Assessment & Plan  Patient noted to have persistently elevated BPs during admission   Per chart review patient was previously prescribed HCTZ 25 mg.   Reports she has not taken any blood pressure medication in the last 3 years.   HCTZ restarted and amlodipine added   BP improved; Most recent /93; preceding 24 hr range 151//119  Continue HCTZ and amlodipine on discharge  Recommend outpatient f/u PCP    Hepatitis C antibody positive in blood  Assessment & Plan  Acute hepatitis panel reactive hep C ab  Recommend outpatient RNA quant test  F/u w/ GI or Hepatologist on d/c  Encourage cessation of IVDU    Tobacco use disorder  Assessment & Plan  Patient admits to smoking approximately 12 cigarettes/day  Encourage cessation  Offer NRT    Intravenous drug abuse (HCC)  Assessment & Plan  Patient has multiple injection sites on bilateral legs and arms,  HIV non-reactive, Hep C reactive (see \"Hepatitis C antibody positive in blood\"), Hep B non-reactive  Opioid " "withdrawal managed as above   Encourage cessation of IVDU    Cocaine abuse (HCC)  Assessment & Plan  Patient admits to smoking cocaine approximately twice weekly  Denies any chest pain  EKG performed in the ED no evidence of acute ischemia  UDS + cocaine   Encourage cessation, continue supportive care/comfort meds    Opioid use disorder, severe, dependence (HCC)  Assessment & Plan  Patient admits to using IV heroin for the past 3 years straight, has not been sober in these past 3 years  She states she is using approximately 6-10 bags daily, states she tries not to use more than 1 bundle per day  Last reported use was around 1 AM on 1/16/24  She was on Suboxone years ago, only had it filled 1 time. Was also at methadone clinic recently and followed for 3 days only   Upon reviewing PDMP, last and only Suboxone prescription was filled on 2/1/2023 for 8 day supply   Agreeable to suboxone MAT treatment  Opioid withdrawal managed as above  Currently tolerating maintenance Suboxone 8 mg BID- continue  Case management consulted- will be discharged with outpatient resources     Tachycardia-resolved as of 1/20/2024  Assessment & Plan  Troponin 0HR- 7, 2-HR 11, 4 HR- 10   d-dimer: 0.77  CTA head and chest: No central pulmonary embolus. No right heart strain.   No further evidence of tachycardia. Patient denies any chest pain or shortness of breathe.   Resolved       Consultations During Hospital Stay:  Case management  Pharmacy     Procedures Performed:   CTA chest abdomen pelvis   LUQ U/S     Significant Findings / Test Results:   CTA chest abdomen pelvis 1/19/2024: \"No central pulmonary embolus. No right heart strain. Ill-defined low-attenuation lesion in the spleen. Nonemergent follow-up splenic ultrasound recommended to exclude cyst. The study was marked in EPIC for immediate notification. Otherwise unremarkable abdomen and pelvis. \"  LUQ U/S 1/19/2024: \"Noncystic heterogeneous lobulated hypodense lesion in the periphery " "of the lower pole of the spleen corresponding to the area of hypodensity in that location on recent CT. If there is no history of primary malignancy or clinical suspicion of lymphoma then in a 38-year-old female this is statistically most likely to represent a benign finding. In that instance, close interval ultrasound follow-up with next examination to be performed in 6 months would likely be sufficient.\"  Blood cultures negative at 48 hours  Acute hepatitis panel- Hepatitis C Ab reactive  Troponins (7-11-10)  Elevated D-dimer 0.77  R hand Xray 1/17/2024- no evidence of osteomyelitis or significant soft tissue damage  Rapid HIV negative    Incidental Findings:   Non-specific Spleen lesion    Test Results Pending at Discharge (will require follow up):   None      Outpatient Tests / Follow Up Requested:  Follow-up Splenic ultrasound   HCV RNA quant PCR  Recommend f/u with PCP within 1-2 weeks of discharge    Complications:  none    Reason for Admission: opioid withdrawal, opioid use disorder     Hospital Course:     Karen Mason is a 38 y.o. female patient PMH OUD, IVDU, HTN who originally presented to the hospital on 1/16/2024 due to opioid withdrawal.  Patient initially presented to the [facility] ED requesting detoxification from opioids and agreed to initiation of Suboxone. Pt was subsequently admitted to the [facility] Medical withdrawal management Unit for medically assisted opioid withdrawal and Suboxone microinduction.  On admission, she was placed on Butrans patch 20 mcg/hr to slowly introduce Suboxone and prevent precipitated withdrawal.  On 1/17-1/18/24, patient underwent microinduction with Subutex 0.5 mg Q2H x4 doses followed by Suboxone 2 mg Q2H x4 doses, plus an additional 8 mg Suboxone for a total of 18 mg Suboxone during induction, which she tolerated well.  Pt was then stabilized on maintenance Suboxone 8 mg BID without complication, and Butrans patch subsequently removed. During this admission, pt also " "received empiric IV vancomycin and cefepime for multiple injection site wounds of BUE/BLE and was transitioned to oral ABX with negative blood cx prior to d/c. She also received a tetanus booster and R hand XR was negative for OM or significant soft tissue changes. Patient restarted on HCTZ and amlodipine added for better BP control. Case management was consulted for assistance with aftercare resources, and patient will be discharged with outpatient resources.     Please see above list of diagnoses and related plan for additional information.     Condition at Discharge: good     Discharge Day Visit / Exam:     Subjective:  Patient seen and examined bedside this morning. Reports that she is feeling good this morning, denies acute complaints.  Currently denies headaches, lightheadedness/dizziness, coughing/sneezing/congestion/rhinorrhea, chest pain, SOB/dyspnea, abdominal pain, N/V/C/D. Notes she is tolerating maintenance suboxone. Notes she prefers outpatient f/u at this time.     Vitals: Blood Pressure: 149/93 (01/20/24 0716)  Pulse: 88 (01/20/24 0716)  Temperature: 97.7 °F (36.5 °C) (01/20/24 0716)  Temp Source: Temporal (01/20/24 0716)  Respirations: 18 (01/20/24 0716)  Height: 5' 4\" (162.6 cm) (01/18/24 1230)  Weight - Scale: 74.4 kg (164 lb) (01/18/24 1230)  SpO2: 97 % (01/20/24 0716)  Exam:   Physical Exam  Vitals and nursing note reviewed.   Constitutional:       General: She is not in acute distress.     Appearance: Normal appearance. She is well-developed and normal weight. She is not ill-appearing or diaphoretic.   HENT:      Head: Normocephalic and atraumatic.   Eyes:      Conjunctiva/sclera: Conjunctivae normal.   Cardiovascular:      Rate and Rhythm: Normal rate and regular rhythm.      Pulses: Normal pulses.      Heart sounds: Normal heart sounds. No murmur heard.     No friction rub. No gallop.   Pulmonary:      Effort: Pulmonary effort is normal. No respiratory distress.      Breath sounds: Normal " breath sounds. No wheezing, rhonchi or rales.   Abdominal:      General: Abdomen is flat. Bowel sounds are normal. There is no distension.      Palpations: Abdomen is soft.      Tenderness: There is no abdominal tenderness. There is no guarding.   Musculoskeletal:         General: No swelling. Normal range of motion.        Arms:       Cervical back: Normal range of motion.      Right lower leg: No edema.      Left lower leg: No edema.   Skin:     General: Skin is warm and dry.      Comments: No piloerection    Neurological:      General: No focal deficit present.      Mental Status: She is alert and oriented to person, place, and time. Mental status is at baseline.   Psychiatric:         Mood and Affect: Mood normal.       Discussion with Family: I personally did not discuss this case with the patient's family.  I reviewed the discharge plan with the patient and answered all questions to the best of my ability.     Discharge instructions/Information to patient and family:   See after visit summary for information provided to patient and family.      Provisions for Follow-Up Care:  See after visit summary for information related to follow-up care and any pertinent home health orders.      Disposition:     Home    For Discharges to [facility]:   Not Applicable to this Patient - Not Applicable to this Patient    Planned Readmission: n/a     Discharge Statement:  I spent 35 minutes discharging the patient. This time was spent on the day of discharge. I had direct contact with the patient on the day of discharge. Greater than 50% of the total time was spent examining patient, answering all patient questions, arranging and discussing plan of care with patient as well as directly providing post-discharge instructions.  Additional time then spent on discharge activities.    Discharge Medications:  See after visit summary for reconciled discharge medications provided to patient and family.      ** Please  Note: This note has been constructed using a voice recognition system **

## 2024-01-19 NOTE — PROGRESS NOTES
"  Progress Note - Medical Toxicology    Karen Vizcainount 38 y.o. female MRN: [id number]  Unit/Bed#: 5T DETOX 510-01 Encounter: [id number]  MEDICAL DETOX UNIT, LEVEL 4  Department of Medical Toxicology  Reason for Admission/Principal Problem: opioid withdrawal   Rounding Provider: Caterina Ontiveros PA-C, Colleen Mercedes MD         Opioid use disorder  Assessment & Plan  Patient admits to using IV heroin for the past 3 years straight, has not been sober in these past 3 years  She states she is using approximately 6-10 bags daily, states she tries not to use more than 1 bundle per day  Last use was around 1 AM on 1/16/24  She was on Suboxone years ago, only had it filled 1 time. Was also at methadone clinic recently and followed for 3 days only   Upon reviewing PDMP, last and only Suboxone prescription was filled on 2/1/2023 for 8 day supply   Agreeable to suboxone treatment  Initiate MAT protocol - See \"Opioid Withdrawal\"    Tachycardia  Assessment & Plan  Obtain EKG- no acute ST elevations   Troponin 0HR- 7, 2-HR 11, 4- 10   d-dimer: 0.77  CTA head and chest: No central pulmonary embolus. No right heart strain.   No further evidence of tachycardia. Patient denies any chest pain or shortness of breathe.       High blood pressure  Assessment & Plan  Blood Pressure: (P) 151/92    - Initially likely secondary to withdrawal, however patient was stabilized on maintenance Suboxone yesterday and still continued to have elevated BP readings despite adequate withdrawal management. Required PRN IV hydralazine and Clonodine  - Internal Med was consulted for further recommendations  - Per chart review patient was previously prescribed HCTZ 25 mg. Reports she has not taken any blood pressure medication in the last 3 years.   - Will restart HCTZ and continue to monitor BP       Hepatitis C antibody positive in blood  Assessment & Plan  HEPATITIS C ANTIBODY IGM + IGG WITH REFLEX TO HCV PCR  Component  Ref Range & Units 1/17/20 1040 " "  HEPATITIS C ANTIBODY  NON-REACTIVE REACTIVE Abnormal    SIGNAL TO CUT-OFF  <1.00 32.80 High       HCV RNA, QUANTITATIVE REAL TIME PCR  NOT DETECTED IU/mL 1,180,000 High    HCV RNA, QUANTITATIVE REAL TIME PCR  NOT DETECTED Log IU/mL 6.07 High        F/u w/ GI or Hepatologist on d/c, obtain outpatient RNA quant test    Tobacco use disorder  Assessment & Plan  Patient admits to smoking approximately 12 cigarettes/day  Encourage cessation  Will order nicotine patch     Intravenous drug abuse (HCC)  Assessment & Plan  Patient has multiple injection sites on bilateral legs and arms, healing wound seen on right 5th digit (Patient says the wound has been present for about a month and appears to be slowly healing following initiation of the abx)  Denies any fevers, WBC count is normal - No evidence of acute infection at this time  Continue to monitor injection sites for signs of infection  HIV non-reactive  Hep C reactive (see \"Hepatitis C antibody positive in blood\")  Hep B nonreactive  Tetanus shot ordered  Obtain repeat CMP & CBC tomorrow am   Concern for possible soft tissue infection/osteomyelitis and bacteremia:  R hand Xray shows no evidence of osteomyelitis or significant soft tissue damage  CRP wnl  Blood cultures- no growth at 24 hours   Transitioned to PO antibiotics         Cocaine abuse (HCC)  Assessment & Plan  Patient admits to smoking cocaine approximately twice weekly  She has been hypertensive throughout encounter likely secondary to anxiety  Denies any chest pain but admits to mild SOB with anxiety  EKG performed in the ED and reviewed by myself- no evidence of acute ischemia  UDS screen is pending  Encourage cessation, continue supportive care/comfort meds    * Opioid withdrawal (HCC)-resolved as of 1/19/2024  Assessment & Plan  See \"Opioid Use Disorder\"  Using IV heroin 6-10 bags daily, last use around 0100  on 1/16  Current withdrawal symptoms include chills, sweats, anxiety, hypertension  Continue " Suboxone 8 mg BID   Case management consulted for dispo plan, pt unsure about inpatient rehab at this point, may d/c w/ [facility] referral if going home        VTE Pharmacologic Prophylaxis:   Pharmacologic: Enoxaparin (Lovenox)  Mechanical VTE Prophylaxis in Place: yes    Code Status: Level 1 - Full Code    Patient Centered Rounds: I have performed bedside rounds with nursing staff today.    Discussions with Specialists or Other Care Team Provider: Case Management   Education and Discussions with Family / Patient: I personally answered all of the patient's questions to the best of my ablity     Time Spent for Care: 30 minutes.  More than 50% of total time spent on counseling and coordination of care as described above.    Current Length of Stay: 3 day(s)    Current Patient Status: Inpatient     Certification Statement: The patient will continue to require additional inpatient hospital stay due to elevated BP requiring IV medication   Discharge Plan: Anticipated Discharge within 24-48 hours           Subjective:   Patient seen at bedside. Denies further withdrawal symptoms. She stated that yesterday she was having chest pain. Denies any chest pain this morning, headache, blurred vision, or lightheadedness. We discussed her blood pressure she states that she was told she has Hypertension but has not taken any blood pressure medication in over 3 years.     Objective:     Clinical Opiate Withdrawal Scale  Pulse: (P) 95  Resting Pulse Rate: Measured After Patient is Sitting or Lying for One Minute: Pulse rate 80 or below  GI Upset: Over Last Half Hour: No GI symptoms  Sweating: Over Past Half Hour Not Accounted for by Room Temperature of Patient Activity: Flushed or observable moistness on face  Tremor: Observation of Outstretched Hands: No tremor  Restlessness: Observation During Assessment: Able to sit still  Yawning: Observation During Assessment: No yawning  Pupil Size: Pupils possibly larger than normal for room  light  Anxiety and Irritability: None  Bone or Joint Aches: If Patient was Having Pain Previously, Only the Additional Component Attributed to Opiate Withdrawal is Scored: Not present  Gooseflesh Skin: Skin is smooth  Runny Nose or Tearing: Not Accounted for by Cold Symptoms or Allergies: Not present  Clinical Opiate Withdrawal Scale Total Score: 3    No data recorded      Last 24 Hours Medication List:   Current Facility-Administered Medications   Medication Dose Route Frequency Provider Last Rate    buprenorphine-naloxone  8 mg Sublingual BID Caterina Katherin Ontiveros, PA-C      cephalexin  500 mg Oral Q6H [facility] [place] Katherin Ontiveros, PA-C      cloNIDine  0.1 mg Oral Q6H PRN Patti Mitchell, PA-C      enoxaparin  40 mg Subcutaneous Daily Patti Mitchell, PA-C      gabapentin  300 mg Oral Q8H PRN Patti Mitchell, PA-C      hydrALAZINE  10 mg Intravenous Q6H PRN Caterina Katherin Ontiveros, PA-C      hydrochlorothiazide  25 mg Oral Daily Caterina Katherin Ontiveros, PA-C      ibuprofen  400 mg Oral Q4H PRN Patti Mitchell, PA-C      melatonin  6 mg Oral HS PRN Gloria Flores, PA-C      multivitamin-minerals  1 tablet Oral Daily Patti Mitchell, PA-C      nicotine  1 patch Transdermal Daily Patti Mitchell, PA-C      nicotine polacrilex  2 mg Oral Q2H PRN Patti Mitchell, PA-C      ondansetron  4 mg Intravenous Q6H PRN Patti Mitchell, PA-C      senna  1 tablet Oral Daily Patti Mitchell, PA-C      sulfamethoxazole-trimethoprim  1 tablet Oral Q12H [facility] Katherin Ontiveros, PA-C      traZODone  50 mg Oral HS PRN Patti Mitchell, PA-C           Vitals:   Temp (24hrs), Av.3 °F (36.3 °C), Min:96.9 °F (36.1 °C), Max:97.7 °F (36.5 °C)    Temp:  [96.9 °F (36.1 °C)-97.7 °F (36.5 °C)] 97.6 °F (36.4 °C)  HR:  [78-97] (P) 95  Resp:  [16-18] (P) 18  BP: (149-204)/() (P) 151/92  SpO2:  [95 %-98 %] (P) 97 %  Body mass index is 28.15 kg/m².     Input and Output Summary  (last 24 hours):No intake or output data in the 24 hours ending 01/19/24 1102    Physical Exam:   Physical Exam  Vitals and nursing note reviewed.   Constitutional:       General: She is not in acute distress.     Appearance: She is not diaphoretic.   Eyes:      General: No scleral icterus.     Pupils: Pupils are equal, round, and reactive to light.   Cardiovascular:      Rate and Rhythm: Normal rate and regular rhythm.      Heart sounds: No murmur heard.  Pulmonary:      Effort: No respiratory distress.      Breath sounds: Normal breath sounds.   Abdominal:      General: Bowel sounds are normal. There is no distension.      Palpations: Abdomen is soft.   Neurological:      Mental Status: She is alert and oriented to person, place, and time.   Psychiatric:         Mood and Affect: Mood is not anxious or depressed.         Additional Data:     Labs: keep all most recent labs as listed on admission templates   Results from last 7 days   Lab Units 01/19/24  0517   WBC Thousand/uL 6.16   HEMOGLOBIN g/dL 12.9   HEMATOCRIT % 40.0   PLATELETS Thousands/uL 447*   NEUTROS PCT % 42*   LYMPHS PCT % 44   MONOS PCT % 11   EOS PCT % 2      Results from last 7 days   Lab Units 01/19/24  0517 01/18/24  0410   SODIUM mmol/L 138 138   POTASSIUM mmol/L 4.7 3.5   CHLORIDE mmol/L 106 105   CO2 mmol/L 23 23   BUN mg/dL 13 10   CREATININE mg/dL 0.82 0.75   ANION GAP mmol/L 9 10   CALCIUM mg/dL 9.7 9.4   ALBUMIN g/dL  --  3.7   TOTAL BILIRUBIN mg/dL  --  0.47   ALK PHOS U/L  --  59   ALT U/L  --  6*   AST U/L  --  12*   GLUCOSE RANDOM mg/dL 87 77                              * I Have Reviewed All Lab Data Listed Above.  * Additional Pertinent Lab Tests Reviewed: All Labs For Current Hospital Admission Reviewed      Imaging Studies: I have personally reviewed pertinent reports.        Recent Cultures (last 7 days):  Results from last 7 days   Lab Units 01/17/24  1242   BLOOD CULTURE  No Growth at 24 hrs.  No Growth at 24 hrs.          Today, Patient Was Seen By: Caterina Ontiveros PA-C    ** Please Note: Dictation voice to text software may have been used in the creation of this document. **

## 2024-01-19 NOTE — PROGRESS NOTES
Vancomycin therapy has been discontinued.   Thank you for this consult;  Pharmacy will sign-off now.

## 2024-01-20 VITALS
RESPIRATION RATE: 18 BRPM | HEART RATE: 88 BPM | TEMPERATURE: 97.7 F | DIASTOLIC BLOOD PRESSURE: 93 MMHG | OXYGEN SATURATION: 97 % | WEIGHT: 164 LBS | HEIGHT: 64 IN | BODY MASS INDEX: 28 KG/M2 | SYSTOLIC BLOOD PRESSURE: 149 MMHG

## 2024-01-20 PROBLEM — L03.113 CELLULITIS OF RIGHT HAND: Status: ACTIVE | Noted: 2024-01-20

## 2024-01-20 PROBLEM — F11.20 OPIOID USE DISORDER, SEVERE, DEPENDENCE (HCC): Status: ACTIVE | Noted: 2024-01-16

## 2024-01-20 PROBLEM — R00.0 TACHYCARDIA: Status: RESOLVED | Noted: 2024-01-18 | Resolved: 2024-01-20

## 2024-01-20 PROCEDURE — 99239 HOSP IP/OBS DSCHRG MGMT >30: CPT | Performed by: PHYSICIAN ASSISTANT

## 2024-01-20 RX ORDER — SULFAMETHOXAZOLE AND TRIMETHOPRIM 800; 160 MG/1; MG/1
1 TABLET ORAL EVERY 12 HOURS SCHEDULED
Qty: 20 TABLET | Refills: 0 | Status: SHIPPED | OUTPATIENT
Start: 2024-01-20 | End: 2024-01-30

## 2024-01-20 RX ORDER — BUPRENORPHINE AND NALOXONE 8; 2 MG/1; MG/1
8 FILM, SOLUBLE BUCCAL; SUBLINGUAL 2 TIMES DAILY
Qty: 60 FILM | Refills: 0 | Status: SHIPPED | OUTPATIENT
Start: 2024-01-20 | End: 2024-02-19

## 2024-01-20 RX ORDER — CEPHALEXIN 500 MG/1
500 CAPSULE ORAL EVERY 6 HOURS SCHEDULED
Qty: 40 CAPSULE | Refills: 0 | Status: SHIPPED | OUTPATIENT
Start: 2024-01-20 | End: 2024-01-30

## 2024-01-20 RX ORDER — HYDROCHLOROTHIAZIDE 25 MG/1
25 TABLET ORAL DAILY
Qty: 30 TABLET | Refills: 0 | Status: SHIPPED | OUTPATIENT
Start: 2024-01-20

## 2024-01-20 RX ORDER — AMLODIPINE BESYLATE 5 MG/1
5 TABLET ORAL DAILY
Qty: 30 TABLET | Refills: 0 | Status: SHIPPED | OUTPATIENT
Start: 2024-01-20

## 2024-01-20 RX ADMIN — BUPRENORPHINE AND NALOXONE 8 MG: 8; 2 FILM BUCCAL; SUBLINGUAL at 09:21

## 2024-01-20 RX ADMIN — AMLODIPINE BESYLATE 5 MG: 5 TABLET ORAL at 09:21

## 2024-01-20 RX ADMIN — SENNOSIDES 8.6 MG: 8.6 TABLET, FILM COATED ORAL at 09:21

## 2024-01-20 RX ADMIN — SULFAMETHOXAZOLE AND TRIMETHOPRIM 1 TABLET: 800; 160 TABLET ORAL at 09:21

## 2024-01-20 RX ADMIN — CEPHALEXIN 500 MG: 500 CAPSULE ORAL at 05:09

## 2024-01-20 RX ADMIN — NICOTINE POLACRILEX 2 MG: 2 GUM, CHEWING ORAL at 07:27

## 2024-01-20 RX ADMIN — MULTIPLE VITAMINS W/ MINERALS TAB 1 TABLET: TAB ORAL at 09:21

## 2024-01-20 RX ADMIN — NICOTINE POLACRILEX 2 MG: 2 GUM, CHEWING ORAL at 09:32

## 2024-01-20 RX ADMIN — HYDROCHLOROTHIAZIDE 25 MG: 25 TABLET ORAL at 09:21

## 2024-01-20 NOTE — ASSESSMENT & PLAN NOTE
Patient admits to smoking cocaine approximately twice weekly  Denies any chest pain  EKG performed in the ED no evidence of acute ischemia  UDS + cocaine   Encourage cessation, continue supportive care/comfort meds

## 2024-01-20 NOTE — ASSESSMENT & PLAN NOTE
Acute hepatitis panel reactive hep C ab  Recommend outpatient RNA quant test  F/u w/ GI or Hepatologist on d/c  Encourage cessation of IVDU

## 2024-01-20 NOTE — PLAN OF CARE
Problem: SUBSTANCE USE/ABUSE  Goal: By discharge, will develop insight into their chemical dependency and sustain motivation to continue in recovery  Description: INTERVENTIONS:  - Attends all daily group sessions and scheduled AA groups  - Actively practices coping skills through participation in the therapeutic community and adherence to program rules  - Reviews and completes assignments from individual treatment plan  - Assist patient development of understanding of their personal cycle of addiction and relapse triggers  Outcome: Adequate for Discharge  Goal: By discharge, patient will have ongoing treatment plan addressing chemical dependency  Description: INTERVENTIONS:  - Assist patient with resources and/or appointments for ongoing recovery based living  Outcome: Adequate for Discharge

## 2024-01-20 NOTE — ASSESSMENT & PLAN NOTE
Troponin 0HR- 7, 2-HR 11, 4 HR- 10   d-dimer: 0.77  CTA head and chest: No central pulmonary embolus. No right heart strain.   No further evidence of tachycardia. Patient denies any chest pain or shortness of breathe.   Resolved

## 2024-01-20 NOTE — INCIDENTAL FINDINGS
"The following findings require follow up:  Radiographic finding   Finding: Left Upper Quadrant US 1/19/2024: \"Noncystic heterogeneous lobulated hypodense lesion in the periphery of the lower pole of the spleen corresponding to the area of hypodensity in that location on recent CT. If there is no history of primary malignancy or clinical suspicion of lymphoma then in a 38-year-old female this is statistically most likely to represent a benign finding. In that instance, close interval ultrasound follow-up with next examination to be performed in 6 months would likely be sufficient.\"   Follow up required: repeat LUQ U/S in 6 months    Follow up should be done within 6 month(s)    Please notify the following clinician to assist with the follow up: PCP  "

## 2024-01-20 NOTE — ASSESSMENT & PLAN NOTE
Using IV heroin 6-10 bags daily, last use around 0100  on 1/16  Suboxone micro-induction completed 1/17-1/18  Currently tolerating maintenance Suboxone 8 mg BID   Acute withdrawal resolved

## 2024-01-20 NOTE — ASSESSMENT & PLAN NOTE
Patient admits to using IV heroin for the past 3 years straight, has not been sober in these past 3 years  She states she is using approximately 6-10 bags daily, states she tries not to use more than 1 bundle per day  Last reported use was around 1 AM on 1/16/24  She was on Suboxone years ago, only had it filled 1 time. Was also at methadone clinic recently and followed for 3 days only   Upon reviewing PDMP, last and only Suboxone prescription was filled on 2/1/2023 for 8 day supply   Agreeable to suboxone MAT treatment  Opioid withdrawal managed as above  Currently tolerating maintenance Suboxone 8 mg BID- continue  Case management consulted- will be discharged with outpatient resources

## 2024-01-20 NOTE — ASSESSMENT & PLAN NOTE
healing wound seen on right 5th digit (Previous injection site) (  Patient says the wound has been present for about a month and appears to be slowly healing following initiation of the abx  no diffuse erythema on exam today, no TTP, neurovascularly intact  VSS (afebrile). No leukocytosis on CBC  Tetanus shot administered 1/16/2024  R hand Xray 1/17/2024: no evidence of osteomyelitis or significant soft tissue damage  Blood cultures- no growth at 48 hours   Transitioned to PO antibiotics yesterday, continue on d/c for 10 days total

## 2024-01-20 NOTE — ASSESSMENT & PLAN NOTE
Patient noted to have persistently elevated BPs during admission   Per chart review patient was previously prescribed HCTZ 25 mg.   Reports she has not taken any blood pressure medication in the last 3 years.   HCTZ restarted and amlodipine added   BP improved; Most recent /93; preceding 24 hr range 151//119  Continue HCTZ and amlodipine on discharge  Recommend outpatient f/u PCP

## 2024-01-20 NOTE — PROGRESS NOTES
Late entry: Gloria Flores PA-C was notified via face-to-face conversation of BP.  No new orders at this time.

## 2024-01-20 NOTE — ASSESSMENT & PLAN NOTE
"Patient has multiple injection sites on bilateral legs and arms,  HIV non-reactive, Hep C reactive (see \"Hepatitis C antibody positive in blood\"), Hep B non-reactive  Opioid withdrawal managed as above   Encourage cessation of IVDU  "

## 2024-01-20 NOTE — CASE MANAGEMENT
"CM met with pt to check in about dc plans for today. Pt reported feeling ready to dc.     Pt reported she wants to \"get herself together\" before she returns to [place]. Pt reported her only concern is her dog and getting back there. Pt reported being aware of who to call for outpatient follow up and will once she returns to [place].     Pt medications being filled at [facility] and pt will dc with 30 day supply.     Pt reported she will need a ride to the [facility]: [address] [address]     [email or web address] transportation scheduled via round trip for 10:30am to above address.   "

## 2024-01-22 LAB
BACTERIA BLD CULT: NORMAL
BACTERIA BLD CULT: NORMAL

## 2024-01-22 NOTE — UTILIZATION REVIEW
NOTIFICATION OF ADMISSION DISCHARGE   This is a Notification of Discharge from [facility]. Please be advised that this patient has been discharge from our facility. Below you will find the admission and discharge date and time including the patient’s disposition.   UTILIZATION REVIEW CONTACT:  Geovanna Wilson MA  Utilization   Network Utilization Review Department  Phone: [phone] x carefully listen to the prompts. All voicemails are confidential.  Email: [email or web address]     ADMISSION INFORMATION  PRESENTATION DATE: 1/16/2024 11:20 AM  OBERVATION ADMISSION DATE:   INPATIENT ADMISSION DATE: 1/16/24 12:43 PM   DISCHARGE DATE: 1/20/2024 10:29 AM   DISPOSITION:Home/Self Care    Network Utilization Review Department  ATTENTION: Please call with any questions or concerns to [phone] and carefully listen to the prompts so that you are directed to the right person. All voicemails are confidential.   For Discharge needs, contact Care Management DC Support Team at [phone] opt. 2  Send all requests for admission clinical reviews, approved or denied determinations and any other requests to dedicated fax number below belonging to the campus where the patient is receiving treatment. List of dedicated fax numbers for the Facilities:  FACILITY NAME UR FAX NUMBER   ADMISSION DENIALS (Administrative/Medical Necessity) [phone]   DISCHARGE SUPPORT TEAM ([facility]) [phone]   PARENT CHILD HEALTH (Maternity/NICU/Pediatrics) [phone]   [facility] [phone]   [facility] [phone]   [facility] [phone]   [facility] [phone]   [facility] [phone]   [facility] [phone]   [facility] [phone]   [facility]  [phone]   [facility] [phone]   [facility] [phone]   [facility] [phone]